# Patient Record
Sex: FEMALE | Race: BLACK OR AFRICAN AMERICAN | NOT HISPANIC OR LATINO | Employment: UNEMPLOYED | URBAN - METROPOLITAN AREA
[De-identification: names, ages, dates, MRNs, and addresses within clinical notes are randomized per-mention and may not be internally consistent; named-entity substitution may affect disease eponyms.]

---

## 2023-05-12 ENCOUNTER — APPOINTMENT (OUTPATIENT)
Dept: LAB | Facility: LAB | Age: 53
End: 2023-05-12
Payer: COMMERCIAL

## 2023-05-12 LAB
ALBUMIN (G/DL) IN SER/PLAS: 3.8 G/DL (ref 3.4–5)
ANION GAP IN SER/PLAS: 10 MMOL/L (ref 10–20)
CALCIUM (MG/DL) IN SER/PLAS: 8.5 MG/DL (ref 8.6–10.6)
CARBON DIOXIDE, TOTAL (MMOL/L) IN SER/PLAS: 31 MMOL/L (ref 21–32)
CHLORIDE (MMOL/L) IN SER/PLAS: 105 MMOL/L (ref 98–107)
CHOLESTEROL (MG/DL) IN SER/PLAS: 137 MG/DL (ref 0–199)
CHOLESTEROL IN HDL (MG/DL) IN SER/PLAS: 48.5 MG/DL
CHOLESTEROL/HDL RATIO: 2.8
CREATININE (MG/DL) IN SER/PLAS: 0.8 MG/DL (ref 0.5–1.05)
GFR FEMALE: 88 ML/MIN/1.73M2
GLUCOSE (MG/DL) IN SER/PLAS: 86 MG/DL (ref 74–99)
LDL: 67 MG/DL (ref 0–99)
MAGNESIUM (MG/DL) IN SER/PLAS: 2.14 MG/DL (ref 1.6–2.4)
NATRIURETIC PEPTIDE B (PG/ML) IN SER/PLAS: 34 PG/ML (ref 0–99)
PHOSPHATE (MG/DL) IN SER/PLAS: 3.1 MG/DL (ref 2.5–4.9)
POTASSIUM (MMOL/L) IN SER/PLAS: 4 MMOL/L (ref 3.5–5.3)
SODIUM (MMOL/L) IN SER/PLAS: 142 MMOL/L (ref 136–145)
TRIGLYCERIDE (MG/DL) IN SER/PLAS: 107 MG/DL (ref 0–149)
UREA NITROGEN (MG/DL) IN SER/PLAS: 16 MG/DL (ref 6–23)
VLDL: 21 MG/DL (ref 0–40)

## 2023-05-15 ENCOUNTER — APPOINTMENT (OUTPATIENT)
Dept: PRIMARY CARE | Facility: CLINIC | Age: 53
End: 2023-05-15
Payer: COMMERCIAL

## 2023-10-18 DIAGNOSIS — K21.9 GASTRO-ESOPHAGEAL REFLUX DISEASE WITHOUT ESOPHAGITIS: ICD-10-CM

## 2023-10-19 RX ORDER — OMEPRAZOLE 20 MG/1
20 CAPSULE, DELAYED RELEASE ORAL DAILY
Qty: 90 CAPSULE | Refills: 1 | Status: SHIPPED | OUTPATIENT
Start: 2023-10-19 | End: 2024-02-21 | Stop reason: SDUPTHER

## 2023-10-19 RX ORDER — OMEPRAZOLE 20 MG/1
20 CAPSULE, DELAYED RELEASE ORAL DAILY
COMMUNITY
End: 2023-10-19 | Stop reason: SDUPTHER

## 2023-10-31 ENCOUNTER — TELEPHONE (OUTPATIENT)
Dept: PRIMARY CARE | Facility: HOSPITAL | Age: 53
End: 2023-10-31
Payer: COMMERCIAL

## 2023-10-31 DIAGNOSIS — R09.81 NASAL CONGESTION: Primary | ICD-10-CM

## 2023-10-31 RX ORDER — GUAIFENESIN 600 MG/1
600 TABLET, EXTENDED RELEASE ORAL 2 TIMES DAILY
Status: DISCONTINUED | OUTPATIENT
Start: 2023-10-31 | End: 2023-10-31

## 2023-10-31 RX ORDER — CODEINE PHOSPHATE AND GUAIFENESIN 10; 100 MG/5ML; MG/5ML
5 SOLUTION ORAL EVERY 6 HOURS PRN
Qty: 140 ML | Refills: 0 | Status: SHIPPED | OUTPATIENT
Start: 2023-10-31 | End: 2023-11-07

## 2023-10-31 NOTE — TELEPHONE ENCOUNTER
Patient called requesting RX for generic Mucinex. Patient states her sinuses are draining and the mucous is to thick for her to cough up.    [facility] pharmacy on [address]

## 2023-11-29 ENCOUNTER — OFFICE VISIT (OUTPATIENT)
Dept: OBSTETRICS AND GYNECOLOGY | Facility: HOSPITAL | Age: 53
End: 2023-11-29
Payer: COMMERCIAL

## 2023-11-29 VITALS
HEIGHT: 65 IN | BODY MASS INDEX: 44.65 KG/M2 | WEIGHT: 268 LBS | SYSTOLIC BLOOD PRESSURE: 135 MMHG | DIASTOLIC BLOOD PRESSURE: 88 MMHG

## 2023-11-29 DIAGNOSIS — Z01.419 WELL WOMAN EXAM: Primary | ICD-10-CM

## 2023-11-29 DIAGNOSIS — Z11.3 SCREENING FOR STD (SEXUALLY TRANSMITTED DISEASE): ICD-10-CM

## 2023-11-29 DIAGNOSIS — E04.1 THYROID NODULE: ICD-10-CM

## 2023-11-29 DIAGNOSIS — R10.2 PELVIC PRESSURE IN FEMALE: ICD-10-CM

## 2023-11-29 LAB
POC APPEARANCE, URINE: CLEAR
POC BILIRUBIN, URINE: NEGATIVE
POC BLOOD, URINE: ABNORMAL
POC COLOR, URINE: YELLOW
POC GLUCOSE, URINE: NEGATIVE MG/DL
POC KETONES, URINE: NEGATIVE MG/DL
POC LEUKOCYTES, URINE: NEGATIVE
POC NITRITE,URINE: NEGATIVE
POC PH, URINE: 6 PH
POC PROTEIN, URINE: NEGATIVE MG/DL
POC SPECIFIC GRAVITY, URINE: >=1.03
POC UROBILINOGEN, URINE: 0.2 EU/DL

## 2023-11-29 PROCEDURE — 87086 URINE CULTURE/COLONY COUNT: CPT

## 2023-11-29 PROCEDURE — 99203 OFFICE O/P NEW LOW 30 MIN: CPT

## 2023-11-29 PROCEDURE — 99213 OFFICE O/P EST LOW 20 MIN: CPT

## 2023-11-29 PROCEDURE — 1036F TOBACCO NON-USER: CPT

## 2023-11-29 PROCEDURE — 87800 DETECT AGNT MULT DNA DIREC: CPT

## 2023-11-29 PROCEDURE — 87661 TRICHOMONAS VAGINALIS AMPLIF: CPT | Mod: 59

## 2023-11-29 RX ORDER — ACETAMINOPHEN 325 MG/1
500 TABLET ORAL EVERY 4 HOURS PRN
COMMUNITY
Start: 2015-08-20 | End: 2024-02-21 | Stop reason: SDUPTHER

## 2023-11-29 RX ORDER — ASPIRIN 81 MG/1
81 TABLET ORAL DAILY
COMMUNITY
End: 2024-02-21 | Stop reason: WASHOUT

## 2023-11-29 RX ORDER — CARVEDILOL 25 MG/1
25 TABLET ORAL 2 TIMES DAILY
COMMUNITY
End: 2024-02-21 | Stop reason: SDUPTHER

## 2023-11-29 RX ORDER — HYDRALAZINE HYDROCHLORIDE 10 MG/1
10 TABLET, FILM COATED ORAL 2 TIMES DAILY
COMMUNITY
End: 2024-02-21 | Stop reason: SDUPTHER

## 2023-11-29 RX ORDER — LIDOCAINE 560 MG/1
2 PATCH PERCUTANEOUS; TOPICAL; TRANSDERMAL DAILY
COMMUNITY
Start: 2022-12-07 | End: 2024-02-21 | Stop reason: SDUPTHER

## 2023-11-29 RX ORDER — SPIRONOLACTONE 25 MG/1
25 TABLET ORAL DAILY
COMMUNITY
Start: 2021-08-03 | End: 2024-02-21 | Stop reason: WASHOUT

## 2023-11-29 RX ORDER — FUROSEMIDE 40 MG/1
40 TABLET ORAL DAILY
COMMUNITY
End: 2024-02-21 | Stop reason: SDUPTHER

## 2023-11-29 RX ORDER — LORATADINE 10 MG/1
1 TABLET ORAL DAILY
COMMUNITY
Start: 2018-02-05 | End: 2024-04-02 | Stop reason: ALTCHOICE

## 2023-11-29 SDOH — ECONOMIC STABILITY: FOOD INSECURITY: WITHIN THE PAST 12 MONTHS, THE FOOD YOU BOUGHT JUST DIDN'T LAST AND YOU DIDN'T HAVE MONEY TO GET MORE.: NEVER TRUE

## 2023-11-29 SDOH — ECONOMIC STABILITY: HOUSING INSECURITY
IN THE LAST 12 MONTHS, WAS THERE A TIME WHEN YOU DID NOT HAVE A STEADY PLACE TO SLEEP OR SLEPT IN A SHELTER (INCLUDING NOW)?: NO

## 2023-11-29 SDOH — ECONOMIC STABILITY: INCOME INSECURITY: IN THE LAST 12 MONTHS, WAS THERE A TIME WHEN YOU WERE NOT ABLE TO PAY THE MORTGAGE OR RENT ON TIME?: NO

## 2023-11-29 SDOH — ECONOMIC STABILITY: FOOD INSECURITY: WITHIN THE PAST 12 MONTHS, YOU WORRIED THAT YOUR FOOD WOULD RUN OUT BEFORE YOU GOT MONEY TO BUY MORE.: NEVER TRUE

## 2023-11-29 ASSESSMENT — PATIENT HEALTH QUESTIONNAIRE - PHQ9
1. LITTLE INTEREST OR PLEASURE IN DOING THINGS: NOT AT ALL
2. FEELING DOWN, DEPRESSED OR HOPELESS: NOT AT ALL
SUM OF ALL RESPONSES TO PHQ9 QUESTIONS 1 AND 2: 0

## 2023-11-29 NOTE — PROGRESS NOTES
"Assessment/Plan   Problem List Items Addressed This Visit             ICD-10-CM    Well woman exam - Primary Z01.419     Well woman exam completed; PAP due in 2027         Relevant Orders    TSH with reflex to Free T4 if abnormal    CBC    Colonoscopy Screening; Average Risk Patient    Pelvic pressure in female R10.2     Patient states she \"feels pelvic pressure and has random spotting\" post voiding.  Urine dip shows trace blood; urine culture sent; results pending  Ultrasound ordered; patient to schedule         Relevant Orders    POC Urine Dip    US PELVIS TRANSABDOMINAL WITH TRANSVAGINAL    Urine culture    Thyroid nodule E04.1     Thyroid nodule palpated on patient's right side; TSH ordered; results pending  Referral to endocrinology ordered; patient to schedule         Relevant Orders    Referral to Reproductive Endocrinology     Other Visit Diagnoses         Codes    Screening for STD (sexually transmitted disease)     Z11.3    Relevant Orders    C. Trachomatis / N. Gonorrhoeae, Amplified Detection    Trichomonas vaginalis, Nucleic Acid Detection    HIV 1/2 Antigen/Antibody Screen with Reflex to Confirmation    Hepatitis B Surface Antigen    Hepatitis C Antibody    Syphilis Screen with Reflex            Isabella Bolaños, SONNY-FLORESM     Subjective   Carol Arevalo is a 53 y.o. female who is here for a routine exam.     Concerns today:  Spotting and more irregular menses    Patient's last menstrual period was 10/12/2023.   Periods are regular every 28-30 days, lasting  7-10  days.  Patient recently has noticed periods lasting longer with intermittent spotting after voiding.   Dysmenorrhea:none. Notes having pelvic pressure and pain with the spotting post voiding  Cyclic symptoms include none.     Sexual Activity: sexually active, male partners; Patient reports 1 partners in the last 12 months.  Pain with intercourse? No   Loss of desire? No       History of prior STI: none    Current contraception: " "none    Last pap:   History of abnormal Pap smear: no  Family history of uterine or ovarian cancer: no    Last mammogram:   History of abnormal mammogram: no  Family history of breast cancer: no  Menstrual History:  OB History          3    Para   3    Term   3            AB        Living   3         SAB        IAB        Ectopic        Multiple        Live Births   3                  Patient's last menstrual period was 10/12/2023.       Objective   /88   Ht 1.651 m (5' 5\")   Wt 122 kg (268 lb)   LMP 10/12/2023   BMI 44.60 kg/m²   Physical Exam  Constitutional:       Appearance: Normal appearance.   Genitourinary:      Vulva, bladder and urethral meatus normal.      Genitourinary Comments: Fibrous tissue noted bilaterally      Vaginal cuff intact.     Uterus is midaxial.      Pelvic exam was performed with patient in the lithotomy position.   Breasts:     Breasts are soft.     Right: Normal.      Left: Normal.   HENT:      Head: Normocephalic and atraumatic.      Mouth/Throat:      Mouth: Mucous membranes are moist.   Neck:      Thyroid: Thyroid mass present.      Trachea: Trachea normal.     Cardiovascular:      Rate and Rhythm: Normal rate and regular rhythm.      Heart sounds: Normal heart sounds.   Pulmonary:      Effort: Pulmonary effort is normal.      Breath sounds: Normal breath sounds.   Abdominal:      Palpations: Abdomen is soft.      Comments: Pain illicited upon palpation of mid to lower abdominal area; patient has known fibroids.    Musculoskeletal:         General: Normal range of motion.      Cervical back: Normal range of motion.   Neurological:      Mental Status: She is alert and oriented to person, place, and time.   Skin:     General: Skin is warm and dry.   Psychiatric:         Mood and Affect: Mood normal.         Behavior: Behavior normal.         Thought Content: Thought content normal.         Judgment: Judgment normal.          "

## 2023-11-29 NOTE — ASSESSMENT & PLAN NOTE
Thyroid nodule palpated on patient's right side; TSH ordered; results pending  Referral to endocrinology ordered; patient to schedule

## 2023-11-29 NOTE — ASSESSMENT & PLAN NOTE
"Patient states she \"feels pelvic pressure and has random spotting\" post voiding.  Urine dip shows trace blood; urine culture sent; results pending  Ultrasound ordered; patient to schedule  "

## 2023-11-30 LAB
BACTERIA UR CULT: NORMAL
C TRACH RRNA SPEC QL NAA+PROBE: NEGATIVE
N GONORRHOEA DNA SPEC QL PROBE+SIG AMP: NEGATIVE
T VAGINALIS RRNA SPEC QL NAA+PROBE: NEGATIVE

## 2023-12-30 ENCOUNTER — APPOINTMENT (OUTPATIENT)
Dept: OPHTHALMOLOGY | Facility: CLINIC | Age: 53
End: 2023-12-30
Payer: COMMERCIAL

## 2024-01-08 ENCOUNTER — APPOINTMENT (OUTPATIENT)
Dept: RADIOLOGY | Facility: HOSPITAL | Age: 54
End: 2024-01-08
Payer: COMMERCIAL

## 2024-01-23 ENCOUNTER — APPOINTMENT (OUTPATIENT)
Dept: OPHTHALMOLOGY | Facility: CLINIC | Age: 54
End: 2024-01-23
Payer: COMMERCIAL

## 2024-02-05 DIAGNOSIS — J45.909 UNSPECIFIED ASTHMA, UNCOMPLICATED (HHS-HCC): ICD-10-CM

## 2024-02-05 RX ORDER — ALBUTEROL SULFATE 90 UG/1
AEROSOL, METERED RESPIRATORY (INHALATION)
Qty: 8.5 G | Refills: 3 | Status: SHIPPED | OUTPATIENT
Start: 2024-02-05 | End: 2024-02-22 | Stop reason: SDUPTHER

## 2024-02-21 ENCOUNTER — OFFICE VISIT (OUTPATIENT)
Dept: PRIMARY CARE | Facility: HOSPITAL | Age: 54
End: 2024-02-21
Payer: COMMERCIAL

## 2024-02-21 VITALS
BODY MASS INDEX: 44.48 KG/M2 | HEART RATE: 79 BPM | HEIGHT: 65 IN | SYSTOLIC BLOOD PRESSURE: 129 MMHG | WEIGHT: 267 LBS | TEMPERATURE: 97.6 F | OXYGEN SATURATION: 96 % | DIASTOLIC BLOOD PRESSURE: 81 MMHG

## 2024-02-21 DIAGNOSIS — E66.01 CLASS 3 SEVERE OBESITY WITH BODY MASS INDEX (BMI) OF 40.0 TO 44.9 IN ADULT, UNSPECIFIED OBESITY TYPE, UNSPECIFIED WHETHER SERIOUS COMORBIDITY PRESENT (MULTI): ICD-10-CM

## 2024-02-21 DIAGNOSIS — U09.9 LONG COVID: ICD-10-CM

## 2024-02-21 DIAGNOSIS — K21.9 GASTRO-ESOPHAGEAL REFLUX DISEASE WITHOUT ESOPHAGITIS: ICD-10-CM

## 2024-02-21 DIAGNOSIS — E04.1 THYROID NODULE: ICD-10-CM

## 2024-02-21 DIAGNOSIS — I10 PRIMARY HYPERTENSION: ICD-10-CM

## 2024-02-21 DIAGNOSIS — R52 PAIN: ICD-10-CM

## 2024-02-21 DIAGNOSIS — I50.32 CHRONIC DIASTOLIC CONGESTIVE HEART FAILURE (MULTI): Primary | ICD-10-CM

## 2024-02-21 LAB
ALBUMIN SERPL BCP-MCNC: 4 G/DL (ref 3.4–5)
ANION GAP SERPL CALC-SCNC: 18 MMOL/L (ref 10–20)
BUN SERPL-MCNC: 15 MG/DL (ref 6–23)
CALCIUM SERPL-MCNC: 9 MG/DL (ref 8.6–10.6)
CHLORIDE SERPL-SCNC: 106 MMOL/L (ref 98–107)
CHOLEST SERPL-MCNC: 174 MG/DL (ref 0–199)
CHOLESTEROL/HDL RATIO: 3.3
CO2 SERPL-SCNC: 17 MMOL/L (ref 21–32)
CREAT SERPL-MCNC: 0.93 MG/DL (ref 0.5–1.05)
EGFRCR SERPLBLD CKD-EPI 2021: 74 ML/MIN/1.73M*2
EST. AVERAGE GLUCOSE BLD GHB EST-MCNC: 123 MG/DL
GLUCOSE SERPL-MCNC: 81 MG/DL (ref 74–99)
HBA1C MFR BLD: 5.9 %
HDLC SERPL-MCNC: 52.2 MG/DL
LDLC SERPL CALC-MCNC: 101 MG/DL
MAGNESIUM SERPL-MCNC: 2.79 MG/DL (ref 1.6–2.4)
NON HDL CHOLESTEROL: 122 MG/DL (ref 0–149)
PHOSPHATE SERPL-MCNC: 3.3 MG/DL (ref 2.5–4.9)
POTASSIUM SERPL-SCNC: 4.6 MMOL/L (ref 3.5–5.3)
SODIUM SERPL-SCNC: 136 MMOL/L (ref 136–145)
TRIGL SERPL-MCNC: 105 MG/DL (ref 0–149)
TSH SERPL-ACNC: 1.12 MIU/L (ref 0.44–3.98)
VLDL: 21 MG/DL (ref 0–40)

## 2024-02-21 PROCEDURE — 36415 COLL VENOUS BLD VENIPUNCTURE: CPT

## 2024-02-21 PROCEDURE — 83036 HEMOGLOBIN GLYCOSYLATED A1C: CPT

## 2024-02-21 PROCEDURE — 80061 LIPID PANEL: CPT

## 2024-02-21 PROCEDURE — 99215 OFFICE O/P EST HI 40 MIN: CPT | Mod: GC

## 2024-02-21 PROCEDURE — 99215 OFFICE O/P EST HI 40 MIN: CPT

## 2024-02-21 PROCEDURE — 3008F BODY MASS INDEX DOCD: CPT

## 2024-02-21 PROCEDURE — 3074F SYST BP LT 130 MM HG: CPT

## 2024-02-21 PROCEDURE — 80069 RENAL FUNCTION PANEL: CPT

## 2024-02-21 PROCEDURE — 83735 ASSAY OF MAGNESIUM: CPT

## 2024-02-21 PROCEDURE — 1036F TOBACCO NON-USER: CPT

## 2024-02-21 PROCEDURE — 84443 ASSAY THYROID STIM HORMONE: CPT

## 2024-02-21 PROCEDURE — 3079F DIAST BP 80-89 MM HG: CPT

## 2024-02-21 RX ORDER — ACETAMINOPHEN 325 MG/1
500 TABLET ORAL EVERY 4 HOURS PRN
Qty: 30 TABLET | Refills: 3 | Status: SHIPPED | OUTPATIENT
Start: 2024-02-21 | End: 2024-02-21 | Stop reason: SDUPTHER

## 2024-02-21 RX ORDER — OMEPRAZOLE 20 MG/1
20 CAPSULE, DELAYED RELEASE ORAL DAILY
Qty: 90 CAPSULE | Refills: 1 | Status: SHIPPED | OUTPATIENT
Start: 2024-02-21 | End: 2024-04-02 | Stop reason: SDUPTHER

## 2024-02-21 RX ORDER — SPIRONOLACTONE 100 MG/1
100 TABLET, FILM COATED ORAL DAILY
Qty: 90 TABLET | Refills: 1 | Status: SHIPPED | OUTPATIENT
Start: 2024-02-21 | End: 2024-08-19

## 2024-02-21 RX ORDER — ACETAMINOPHEN 325 MG/1
1000 TABLET ORAL EVERY 8 HOURS PRN
Qty: 30 TABLET | Refills: 3 | Status: SHIPPED | OUTPATIENT
Start: 2024-02-21

## 2024-02-21 RX ORDER — HYDRALAZINE HYDROCHLORIDE 10 MG/1
10 TABLET, FILM COATED ORAL 2 TIMES DAILY
Qty: 180 TABLET | Refills: 1 | Status: SHIPPED | OUTPATIENT
Start: 2024-02-21 | End: 2024-08-19

## 2024-02-21 RX ORDER — LOSARTAN POTASSIUM 25 MG/1
25 TABLET ORAL DAILY
Qty: 90 TABLET | Refills: 1 | Status: SHIPPED | OUTPATIENT
Start: 2024-02-21 | End: 2024-08-19

## 2024-02-21 RX ORDER — ALBUTEROL SULFATE 0.83 MG/ML
2.5 SOLUTION RESPIRATORY (INHALATION) EVERY 6 HOURS PRN
COMMUNITY
Start: 2023-11-29

## 2024-02-21 RX ORDER — LIDOCAINE 560 MG/1
2 PATCH PERCUTANEOUS; TOPICAL; TRANSDERMAL DAILY
Qty: 60 PATCH | Refills: 0 | Status: SHIPPED | OUTPATIENT
Start: 2024-02-21 | End: 2024-03-22

## 2024-02-21 RX ORDER — SPIRONOLACTONE 100 MG/1
100 TABLET, FILM COATED ORAL DAILY
COMMUNITY
Start: 2024-02-04 | End: 2024-02-21 | Stop reason: SDUPTHER

## 2024-02-21 RX ORDER — ISOSORBIDE MONONITRATE 30 MG/1
30 TABLET, EXTENDED RELEASE ORAL DAILY
Qty: 90 TABLET | Refills: 1 | Status: SHIPPED | OUTPATIENT
Start: 2024-02-21 | End: 2024-06-04 | Stop reason: SDUPTHER

## 2024-02-21 RX ORDER — CARVEDILOL 25 MG/1
25 TABLET ORAL 2 TIMES DAILY
Qty: 180 TABLET | Refills: 1 | Status: SHIPPED | OUTPATIENT
Start: 2024-02-21 | End: 2024-06-04 | Stop reason: SDUPTHER

## 2024-02-21 RX ORDER — FUROSEMIDE 40 MG/1
40 TABLET ORAL DAILY
Qty: 90 TABLET | Refills: 1 | Status: SHIPPED | OUTPATIENT
Start: 2024-02-21 | End: 2024-08-19

## 2024-02-21 ASSESSMENT — ENCOUNTER SYMPTOMS
LOSS OF SENSATION IN FEET: 0
OCCASIONAL FEELINGS OF UNSTEADINESS: 0
DEPRESSION: 0

## 2024-02-21 ASSESSMENT — PAIN SCALES - GENERAL: PAINLEVEL: 0-NO PAIN

## 2024-02-21 NOTE — PATIENT INSTRUCTIONS
Dear Ms. Arevalo,    It was a pleasure taking care of you in the [facility].     As discussed today, our plan is:     Labs - you got these done in the office.  Medication changes: I have ordered your medications for you. Please pick these up at your pharmacy.   Please complete your mammogram and your pelvic ultrasound.   Please see your cardiologist.   Vaccines: Please schedule a nurse visit for vaccines.   Please see the psychologist to help with some of the symptoms you are experiencing since your COVID infection.     Please come back to see me in:  3 months.   ------  If you have any problems or questions, please contact the clinic at [phone] to leave a message. Our fax number is [phone]. If your issue cannot wait until the next business day, please go to urgent care or the emergency department.     I also strongly urge all of my patients to register for [facility] by going to: [email or web address]  (The  staff can also send you a text/email link to register when you check out).    No shows: It is understandable if you are unable to make it to a visit, but please cancel your appointment instead of not showing up. This helps to give other patients access to primary care and keeps wait times low.      Dr. Kim Jennings

## 2024-02-21 NOTE — PROGRESS NOTES
CC: Prescription refills and minor aches and pains    HPI: Carol Arevalo is a 53 y.o. with PMH including CHF secondary to NICM (EF 55 to 60% August 2020), morbid obesity, remote history of PE, depressive symptoms, asthma, MIRI, and chronic body pain who presents to the clinic for prescription refills and minor aches and pains.     Interval History/History per chart review: She was last seen in the [facility] in July 2023. At that time her medications were refilled. She was referred to sleep medicine for sleep study, referred for colonoscopy, and referred for mammogram. Vaccines were discussed during that visit.  Since then, the patient has also seen OB/GYN. At that time, she was complaining of pelvic pressure and random spotting.  She was also noted to have a thyroid nodule at that time.    Per the patient:  The patient states that she continues have pain in her breast since feeling lumps now in the right breast.  She states that she has not had a period since January.  Last period was in December.  Endorses tenderness in the breasts.    She continues to endorse pain in her back. She is asking for lidocaine patches. She also states that she is having aches and pains and is feeling pain in the legs. She is describing the sensation as a stiffness. She states that she has to work extra to get out of the chair.    She states that she had COVID 2 to 3 years ago.  She states that she had items that she wore when she had COVID including a wig, hat, shoes, as well as some towels.  Afterwards, she recovered from COVID.  However, she states that when she would put on the items such as the wig or the hat, she would have symptoms of dizziness and nausea.  This would last until she took the garments off.  She also noticed this with the shoes after which she would touch it and would feel aches and pains in the hands from feeling it.  Her symptoms include feeling dizzy for couple of days before getting better as well as feeling  nauseous for 3 days.  She continues to endorse having the sensations even to this day.  Her aches and pains are in the hands, the toes, the legs.  She also endorses a slight headache when she is touching these items.  She can also feel pain on both sides in the back. She also feels queasiness in her stomach. She states that Tylenol helps subside the pain. She feels that rubbing alcohol also helps subside the pain.    She states that she feels like a germophobic.  She says she is spraying things down, has Lysol wipes, and is using rubbing alcohol.  She feels as though there might be a COVID-vaccine.  She is feeling more fatigued than usual, and is tired of constantly washing the house and sanitizing.  She states that if she is not sanitizing it feels like something was missed.  She is not cleaning every day and states that she is not compulsive.  She also endorses a sweet smell that comes around.  She wears gloves when she is doing different activities.    She is also endorsing some chest pain, and it feels as though someone has taken her finger and holding it tightly.  She endorses discomfort in the chest and may be a little bit of pressure.  Sometimes she feels shortness of breath or fatigue when this happens.  She feels sweaty once or twice but otherwise has not felt sweaty.  She said Tylenol and Mucinex helps make the pain better.  Resting makes it better.  Her chest pain is worse when there is nothing available for her to take it.  She states that when she touches something or there is someone around her with a cold or illness, she notices that the sensation coming back up from her feet.    PMHx:   Past Medical History:   Diagnosis Date    Acute sinusitis, unspecified 11/10/2016    Subacute sinusitis, unspecified location    Acute streptococcal tonsillitis, unspecified 04/13/2017    Strep tonsillitis    Cardiomyopathy, unspecified (CMS/[facility]) 09/14/2021    Cardiomyopathy    Cervicalgia 05/26/2017    Spine pain,  cervical    Encounter for gynecological examination (general) (routine) without abnormal findings 05/31/2016    Encounter for annual routine gynecological examination    Encounter for gynecological examination (general) (routine) without abnormal findings 06/10/2016    Well woman exam with routine gynecological exam    Encounter for other general counseling and advice on contraception 05/31/2016    Counseling for birth control, intrauterine device    Encounter for other screening for malignant neoplasm of breast 11/27/2017    Screening for breast cancer    Encounter for screening for infections with a predominantly sexual mode of transmission 05/31/2016    Routine screening for STI (sexually transmitted infection)    Encounter for screening for infections with a predominantly sexual mode of transmission 03/24/2015    Routine screening for STI (sexually transmitted infection)    Encounter for screening for infections with a predominantly sexual mode of transmission     Screening for STDs (sexually transmitted diseases)    Encounter for screening for malignant neoplasm of cervix 01/14/2022    Cervical cancer screening    Essential (primary) hypertension     Chronic hypertension    Morbid (severe) obesity due to excess calories (CMS/[facility]) 03/20/2018    Obesity, morbid (more than 100 lbs over ideal weight or BMI > 40)    Other chest pain 05/04/2017    Atypical chest pain    Other conditions influencing health status     History of vaginal delivery    Other disorders of electrolyte and fluid balance, not elsewhere classified 10/15/2016    Electrolyte and fluid disorder    Other problems related to lifestyle 05/31/2016    Other problems related to lifestyle    Other problems related to lifestyle     Other problems related to lifestyle    Other pulmonary embolism without acute cor pulmonale (CMS/HCC) 12/18/2020    Pulmonary embolism    Personal history of other benign neoplasm 08/30/2016    History of uterine leiomyoma     Personal history of other diseases of the circulatory system     History of congestive heart failure    Personal history of other diseases of the circulatory system 2013    History of sinus tachycardia    Personal history of other diseases of the digestive system 2017    History of esophageal reflux    Personal history of other diseases of the musculoskeletal system and connective tissue 10/13/2017    History of muscle pain    Personal history of other diseases of the respiratory system     History of sore throat    Personal history of other diseases of the respiratory system 11/10/2016    History of sinusitis    Personal history of other specified conditions 10/15/2016    History of dizziness    Personal history of other specified conditions 2015    History of bradycardia    Polyp of cervix uteri 2022    Cervical polyp    Unspecified asthma, uncomplicated 2021    Asthma      PSHx:   Past Surgical History:   Procedure Laterality Date    OTHER SURGICAL HISTORY  2015    Biopsy Vulvar      OB/GYN Hx:   OB History          3    Para   3    Term   3            AB        Living   3         SAB        IAB        Ectopic        Multiple        Live Births   3                FHx: No family history on file.     Social:   Tobacco: Smoked a few cigarettes here and there when she was 18.  Not a current smoker.  However, she is surrounded by secondhand smoke.  Alcohol: She drinks alcohol socially.  She states that she will drink about half a bottle of vodka during a social event.  She drinks the water but reports that she was.  She may have a shot.  Drugs: None  Living situation: Lives with daughter  Work: Yes, for the school [employer]  Sexually active: Yes (1 partner)    ALL:   Allergies   Allergen Reactions    Naproxen Dizziness      Meds:   Current Outpatient Medications:     acetaminophen (Tylenol) 325 mg tablet, Take 500 mg by mouth every 4 hours if needed., Disp: , Rfl:      "ALBUTEROL 0.375 MG/ML CONT NEB SYR-SIMPLE, Inhale 4 times a day as needed., Disp: , Rfl:     albuterol 90 mcg/actuation inhaler, INHALE 1 PUFF BY MOUTH EVERY 4 TO 6 HOURS AS NEEDED FOR COUGH AND SHORTNESS OF BREATH, Disp: 8.5 g, Rfl: 3    aspirin 81 mg EC tablet, Take 1 tablet (81 mg) by mouth once daily., Disp: , Rfl:     carvedilol (Coreg) 25 mg tablet, Take 1 tablet (25 mg) by mouth 2 times a day., Disp: , Rfl:     furosemide (Lasix) 40 mg tablet, Take 0.5 tablets (20 mg) by mouth once daily., Disp: , Rfl:     hydrALAZINE (Apresoline) 10 mg tablet, Take 1 tablet (10 mg) by mouth 2 times a day., Disp: , Rfl:     lidocaine 4 % patch, Place 2 patches on the skin once daily., Disp: , Rfl:     loratadine (Claritin) 10 mg tablet, Take 1 tablet (10 mg) by mouth once daily., Disp: , Rfl:     losartan (Cozaar) 2.5 mg/mL oral suspension, Take 10 mL (25 mg) by mouth once daily., Disp: , Rfl:     omeprazole (PriLOSEC) 20 mg DR capsule, TAKE 1 CAPSULE BY MOUTH EVERY DAY IN THE MORNING BEFORE BREAKFAST, Disp: 90 capsule, Rfl: 1    spironolactone (Aldactone) 25 mg tablet, Take 1 tablet (25 mg) by mouth once daily., Disp: , Rfl:      ROS:   As per HPI.     Objective:  Visit Vitals  /81 (BP Location: Right arm, Patient Position: Sitting, BP Cuff Size: Adult)   Pulse 79   Temp 36.4 °C (97.6 °F) (Temporal)   Ht 1.651 m (5' 5\")   Wt 121 kg (267 lb)   SpO2 96%   BMI 44.43 kg/m²   OB Status Having periods   Smoking Status Never   BSA 2.36 m²     GEN: Awake, alert, no acute distress  HEENT: Normocephalic, atraumatic  CV: Normal S1 and S2, no murmurs, rubs, or gallops. Tenderness to palpation in the sternum  PULM: CTA bilaterally  ABD: Soft, nondistended, nontender  BACK: Pain under the right scapula on touch  EXTREM: Patient endorses swelling, but no peripheral edema  NEURO: 5/5 strength on hip flexion, extension, adduction, abduction, knee flexion, and knee extension    Labs:  Lab Results   Component Value Date    WBC 6.1 " 06/24/2022    HGB 12.1 06/24/2022    HCT 39.3 06/24/2022    MCV 86 06/24/2022     06/24/2022      Lab Results   Component Value Date    GLUCOSE 86 05/12/2023    CALCIUM 8.5 (L) 05/12/2023     05/12/2023    K 4.0 05/12/2023    CO2 31 05/12/2023     05/12/2023    BUN 16 05/12/2023    CREATININE 0.80 05/12/2023      Lab Results   Component Value Date    ALT 8 06/24/2022    AST 15 06/24/2022    ALKPHOS 55 06/24/2022    BILITOT 0.5 06/24/2022      Lab Results   Component Value Date    GLUCOSE 86 05/12/2023    CALCIUM 8.5 (L) 05/12/2023     05/12/2023    K 4.0 05/12/2023    CO2 31 05/12/2023     05/12/2023    BUN 16 05/12/2023    CREATININE 0.80 05/12/2023       Latest Reference Range & Units 05/12/23 16:11   HDL CHOLESTEROL mg/dL 48.5   Cholesterol/HDL Ratio  2.8   LDL Calculated 0 - 99 mg/dL 67   VLDL 0 - 40 mg/dL 21   TRIGLYCERIDES 0 - 149 mg/dL 107      Latest Reference Range & Units 05/12/23 16:11   CHOLESTEROL 0 - 199 mg/dL 137     Urine Studies:   Latest Reference Range & Units 11/29/23 09:52   POC Color, Urine Straw, Yellow, Light-Yellow  Yellow   POC Specific Gravity, Urine 1.005 - 1.035  >=1.030   POC PH, Urine No Reference Range Established PH 6.0   POC Protein, Urine NEGATIVE, 30 (1+) mg/dl NEGATIVE   POC Glucose, Urine NEGATIVE mg/dl NEGATIVE   POC Blood, Urine   NEGATIVE  TRACE-Intact !   POC Ketones, Urine NEGATIVE mg/dl NEGATIVE   POC Bilirubin, Urine NEGATIVE  NEGATIVE   POC Urobilinogen, Urine 0.2, 1.0 EU/DL 0.2   POC Leukocytes, Urine NEGATIVE  NEGATIVE   !: Data is abnormal    MICRO:  Urine culture (11/29): Urine culture negative     Latest Reference Range & Units 11/29/23 09:50   Chlamydia trachomatis, Amplified Negative  Negative   Neisseria gonorrhea,Amplified Negative  Negative   Trichomonas Vaginalis Negative, Invalid, TRICH neg  Negative     Imaging: No new imaging since last visit    Assessment/Plan  Carol Arevalo is a 53 y.o. with PMH including CHF secondary to  an ICM (EF 55 to 60% August 2020), morbid obesity, remote history of PE, depressive symptoms, asthma, MIRI, and chronic body pain who presents to the clinic for prescription refills as well as aches and pains.    #HFpEF (55-60%)  #Lower Extremity Edema likely multifactorial w/ component of venous insufficiency   #Orthostatic Hypotension  - NYHA class II Stage C HF, EF 55% in 2021  Plan:  - c/w carvedilol 25 mg bid, hydralazine 10 mg BID (how it was prescribed in the EMR), isosorbide mononitrate 30 mg daily (patient out of medication for 2 months), spironolactone 100 mg daily  - c/w furosemide 40mg BID  - c/w Losartan 25 mg daily   - Patient had aspirin written in her chart.  She states that she used to be on aspirin, but she has not had aspirin for a year.  Per chart review it appears she has nonischemic cardiomyopathy.  Will not be giving aspirin at this time.  - Needs follow-up with cardiology.  Patient states that she comes to see us first before going to cardiology. Chest pain likely noncardiac in nature given tenderness to palpation on exam.   - Ordered lab work: HgbA1c, RFP, Mag, Lipid Panel     #Breast tenderness  #Fibrocystic breast changes?  - Patient with cyclical breast pain related to periods.   - Mammogram Oct 2022: negative   Plan:  - Mammogram ordered last visit. Patient to complete it.     #Thyroid Nodule  - Had a thyroid nodule noticed on exam at OB/GYN office  - OB/GYN ordered TSH and T4 as well as thyroid ultrasound. Have not been completed.   Plan:  - TSH with reflex to T4  - Thyroid ultrasound at next visit.     #Pelvic pressure  - U/S ordered by OB/GYN, but it was not completed. Patient notified about pelvic ultrasound.     #Musculoskeletal back pain  #Aches and Pains, repetitive behaviors related to COVID-19 infection  - Right latissimus dorsi pain  - Patient has job which requires lifting a lot of objects off ground  - Patient endorsing aches and pains as well as nonspecific symptoms after her  COVID-19 infection   Plan:   - Continue tylenol prn. Continue with lidocaine patches.   - Referred to psychology to help with symptoms and experience after COVID-19 infection, including repetitive behaviors.      #HTN   - Continue carvedilol, hydralazine, isosorbide mononitrate, losartan as prescribed     #GERD  - c/w omeprazole 20mg daily     #Sleep apnea  - Referred to sleep medicine group at the last visit to repeat sleep study.    - Has not been completed. Will readdress at the next visit.      #Health Maintenance  - Immunizations: Influenza (due this season), Tdap (due), COVID (due), PPV23 (given 2015, due for PCV20). Patient states that she has gotten her childhood vaccines. She states she will make a nursing visit for her vaccines.   - Screening:  - Cardiovascular:   The 10-year ASCVD risk score (Lindsay SANCHEZ, et al., 2019) is: 3.6%    Values used to calculate the score:      Age: 53 years      Sex: Female      Is Non- : Yes      Diabetic: No      Tobacco smoker: No      Systolic Blood Pressure: 129 mmHg      Is BP treated: Yes      HDL Cholesterol: 48.5 mg/dL      Total Cholesterol: 137 mg/dL   - Diabetes: HgbA1c today  - Cancer: Breast (Due for Mammogram, ordered at the last visit), Cervical (Pap smear due in 2027), Colorectal (colonoscopy scheduled for March 2024), Lung (Not indicated)  - Infectious Disease: Screened for HIV and Hep in 2016  - Osteoporosis: Not currently indicated    To address at next visit:  [ ] Thyroid ultrasound  [ ] Vaccines if not completed  [ ] Sleep study for sleep apnea    RTC in 3 months.    Pt staffed with attending, Dr. Patrick.

## 2024-02-22 ENCOUNTER — TELEPHONE (OUTPATIENT)
Dept: PRIMARY CARE | Facility: HOSPITAL | Age: 54
End: 2024-02-22
Payer: COMMERCIAL

## 2024-02-22 DIAGNOSIS — J45.909 ASTHMA, UNSPECIFIED ASTHMA SEVERITY, UNSPECIFIED WHETHER COMPLICATED, UNSPECIFIED WHETHER PERSISTENT (HHS-HCC): Primary | ICD-10-CM

## 2024-02-22 DIAGNOSIS — I10 PRIMARY HYPERTENSION: Primary | ICD-10-CM

## 2024-02-22 DIAGNOSIS — J45.909 UNSPECIFIED ASTHMA, UNCOMPLICATED (HHS-HCC): ICD-10-CM

## 2024-02-22 RX ORDER — ALBUTEROL SULFATE 90 UG/1
AEROSOL, METERED RESPIRATORY (INHALATION)
Qty: 8.5 G | Refills: 3 | Status: SHIPPED | OUTPATIENT
Start: 2024-02-22

## 2024-02-22 NOTE — TELEPHONE ENCOUNTER
I attempted to call the patient at the listed phone number to discuss her lab results. She did not answer, so a HIPAA compliant voicemail was placed. Will wait for the patient to call back. If she does not call back, will attempt to call the patient again.     I reviewed the lab results.     Her bicarbonate is 17 and her magnesium is high. Will repeat laboratory levels in 2 weeks. If HCO3- is persistently low, will consider further work-up for metabolic acidosis. Orders placed.     Her Hgb A1c was 5.9, placing her in the prediabetes range. Will discuss with the patient lifestyle modifications and exercise.     Her lipid panel is unremarkable except for an LDL of 101.     The 10-year ASCVD risk score (Lindsay SANCHEZ, et al., 2019) is: 4.1%    Values used to calculate the score:      Age: 53 years      Sex: Female      Is Non- : Yes      Diabetic: No      Tobacco smoker: No      Systolic Blood Pressure: 129 mmHg      Is BP treated: Yes      HDL Cholesterol: 52.2 mg/dL      Total Cholesterol: 174 mg/dL     She has risk enhancers (mainly features of metabolic syndrome). As above, she should begin with lifestyle modifications and exercise to help reduce her risk.     I have ordered her albuterol inhaler and a nebulizer for her.     She should see her cardiologist for optimization of her cardiac health. She should also complete her mammogram and pelvic ultrasound and see the psychologist for help with coping with her symptoms post COVID-19.    Will follow-up with further preventative care at the next visit.

## 2024-02-22 NOTE — PROGRESS NOTES
I saw and evaluated the patient. I personally obtained the key and critical portions of the history and physical exam or was physically present for key and critical portions performed by the resident/fellow. I reviewed the resident/fellow's documentation and discussed the patient with the resident/fellow. I agree with the resident/fellow's medical decision making as documented in the note.    Migel Patrick MD MPH

## 2024-02-23 ENCOUNTER — TELEPHONE (OUTPATIENT)
Dept: PRIMARY CARE | Facility: HOSPITAL | Age: 54
End: 2024-02-23
Payer: COMMERCIAL

## 2024-02-23 NOTE — TELEPHONE ENCOUNTER
Called patient to discuss lab results. We discussed plan to recheck labs in 2 weeks for electrolyte abnormalities and also encouraged lifestyle modifications for elevated HBA1C and LDL. She was agreeable to this plan.

## 2024-03-27 DIAGNOSIS — Z12.11 COLON CANCER SCREENING: Primary | ICD-10-CM

## 2024-03-27 RX ORDER — POLYETHYLENE GLYCOL 3350, SODIUM CHLORIDE, SODIUM BICARBONATE, POTASSIUM CHLORIDE 420; 11.2; 5.72; 1.48 G/4L; G/4L; G/4L; G/4L
4000 POWDER, FOR SOLUTION ORAL ONCE
Qty: 4000 ML | Refills: 0 | Status: SHIPPED | OUTPATIENT
Start: 2024-03-27 | End: 2024-03-27

## 2024-03-27 NOTE — PROGRESS NOTES
Bowel preparation has been prescribed for patient's upcoming colonoscopy procedure.    Philip Redding MD  Gastroenterology

## 2024-03-29 ENCOUNTER — APPOINTMENT (OUTPATIENT)
Dept: GASTROENTEROLOGY | Facility: HOSPITAL | Age: 54
End: 2024-03-29
Payer: COMMERCIAL

## 2024-04-02 ENCOUNTER — OFFICE VISIT (OUTPATIENT)
Dept: PRIMARY CARE | Facility: HOSPITAL | Age: 54
End: 2024-04-02
Payer: COMMERCIAL

## 2024-04-02 VITALS
HEART RATE: 81 BPM | OXYGEN SATURATION: 97 % | WEIGHT: 260 LBS | HEIGHT: 65 IN | BODY MASS INDEX: 43.32 KG/M2 | SYSTOLIC BLOOD PRESSURE: 128 MMHG | DIASTOLIC BLOOD PRESSURE: 84 MMHG | TEMPERATURE: 97.4 F

## 2024-04-02 DIAGNOSIS — E66.01 CLASS 3 SEVERE OBESITY WITH BODY MASS INDEX (BMI) OF 40.0 TO 44.9 IN ADULT, UNSPECIFIED OBESITY TYPE, UNSPECIFIED WHETHER SERIOUS COMORBIDITY PRESENT (MULTI): ICD-10-CM

## 2024-04-02 DIAGNOSIS — J30.2 SEASONAL ALLERGIC RHINITIS, UNSPECIFIED TRIGGER: ICD-10-CM

## 2024-04-02 DIAGNOSIS — R06.83 SNORING: ICD-10-CM

## 2024-04-02 DIAGNOSIS — K21.9 GASTRO-ESOPHAGEAL REFLUX DISEASE WITHOUT ESOPHAGITIS: ICD-10-CM

## 2024-04-02 DIAGNOSIS — E87.20 METABOLIC ACIDOSIS: Primary | ICD-10-CM

## 2024-04-02 DIAGNOSIS — J45.909 ASTHMA, UNSPECIFIED ASTHMA SEVERITY, UNSPECIFIED WHETHER COMPLICATED, UNSPECIFIED WHETHER PERSISTENT (HHS-HCC): ICD-10-CM

## 2024-04-02 DIAGNOSIS — E04.1 THYROID NODULE: ICD-10-CM

## 2024-04-02 LAB
ALBUMIN SERPL BCP-MCNC: 3.9 G/DL (ref 3.4–5)
ANION GAP SERPL CALC-SCNC: 15 MMOL/L (ref 10–20)
BUN SERPL-MCNC: 13 MG/DL (ref 6–23)
CALCIUM SERPL-MCNC: 9.5 MG/DL (ref 8.6–10.6)
CHLORIDE SERPL-SCNC: 103 MMOL/L (ref 98–107)
CO2 SERPL-SCNC: 26 MMOL/L (ref 21–32)
CREAT SERPL-MCNC: 0.82 MG/DL (ref 0.5–1.05)
EGFRCR SERPLBLD CKD-EPI 2021: 85 ML/MIN/1.73M*2
GLUCOSE SERPL-MCNC: 76 MG/DL (ref 74–99)
PHOSPHATE SERPL-MCNC: 4 MG/DL (ref 2.5–4.9)
POTASSIUM SERPL-SCNC: 5 MMOL/L (ref 3.5–5.3)
SODIUM SERPL-SCNC: 139 MMOL/L (ref 136–145)

## 2024-04-02 PROCEDURE — 99215 OFFICE O/P EST HI 40 MIN: CPT | Performed by: STUDENT IN AN ORGANIZED HEALTH CARE EDUCATION/TRAINING PROGRAM

## 2024-04-02 PROCEDURE — 80069 RENAL FUNCTION PANEL: CPT | Performed by: STUDENT IN AN ORGANIZED HEALTH CARE EDUCATION/TRAINING PROGRAM

## 2024-04-02 PROCEDURE — 1036F TOBACCO NON-USER: CPT | Performed by: STUDENT IN AN ORGANIZED HEALTH CARE EDUCATION/TRAINING PROGRAM

## 2024-04-02 PROCEDURE — 99215 OFFICE O/P EST HI 40 MIN: CPT | Mod: GC | Performed by: STUDENT IN AN ORGANIZED HEALTH CARE EDUCATION/TRAINING PROGRAM

## 2024-04-02 PROCEDURE — 3008F BODY MASS INDEX DOCD: CPT | Performed by: STUDENT IN AN ORGANIZED HEALTH CARE EDUCATION/TRAINING PROGRAM

## 2024-04-02 PROCEDURE — 36415 COLL VENOUS BLD VENIPUNCTURE: CPT | Performed by: STUDENT IN AN ORGANIZED HEALTH CARE EDUCATION/TRAINING PROGRAM

## 2024-04-02 RX ORDER — FLUTICASONE PROPIONATE 50 MCG
1 SPRAY, SUSPENSION (ML) NASAL DAILY
Qty: 16 G | Refills: 5 | Status: SHIPPED | OUTPATIENT
Start: 2024-04-02 | End: 2025-04-02

## 2024-04-02 RX ORDER — CETIRIZINE HYDROCHLORIDE 10 MG/1
10 TABLET ORAL DAILY PRN
Qty: 30 TABLET | Refills: 2 | Status: SHIPPED | OUTPATIENT
Start: 2024-04-02 | End: 2024-07-01

## 2024-04-02 RX ORDER — PEN NEEDLE, DIABETIC 31 GX5/16"
NEEDLE, DISPOSABLE MISCELLANEOUS
Qty: 1 EACH | Refills: 0 | Status: SHIPPED | OUTPATIENT
Start: 2024-04-02 | End: 2024-04-09 | Stop reason: SDUPTHER

## 2024-04-02 RX ORDER — OMEPRAZOLE 40 MG/1
40 CAPSULE, DELAYED RELEASE ORAL
Qty: 30 CAPSULE | Refills: 3 | Status: SHIPPED | OUTPATIENT
Start: 2024-04-02 | End: 2024-07-31

## 2024-04-02 ASSESSMENT — ENCOUNTER SYMPTOMS
LOSS OF SENSATION IN FEET: 0
OCCASIONAL FEELINGS OF UNSTEADINESS: 0
DEPRESSION: 0

## 2024-04-02 ASSESSMENT — PAIN SCALES - GENERAL: PAINLEVEL: 1

## 2024-04-02 ASSESSMENT — PATIENT HEALTH QUESTIONNAIRE - PHQ9
2. FEELING DOWN, DEPRESSED OR HOPELESS: NOT AT ALL
SUM OF ALL RESPONSES TO PHQ9 QUESTIONS 1 AND 2: 0
1. LITTLE INTEREST OR PLEASURE IN DOING THINGS: NOT AT ALL

## 2024-04-02 NOTE — PATIENT INSTRUCTIONS
Stephanie Ms. Arevalo,    It was a pleasure meeting you! Today we talked about several important aspects of your health.    1) For your chest pain: I suspect that this may be related to gastroesophageal reflux. To treat this, I would like you to take omeprazole 40 mg daily for the next month to see if this helps. Please return to clinic in 6-8 weeks and we will discuss if this is helping your symptoms.     2) For your throat pain, I would like to get an ultrasound of thyroid to ensure that this is not contributing. Please call [phone] to schedule.    3) For your allergies, please start using one spray of flonase per nostril per day now. I have also sent a prescription for cetirizine (Zyrtec) which you can use daily as needed.     4) Please get your mammogram and colonoscopy done before your next visit if possible.     5) I am also ordering a sleep study to assess your sleep apnea, this may be worsening some of your symptoms.     Please return to clinic in 6-8 weeks discuss if your symptoms are improving with therapy and for vaccines.    All the best,  Dr. Morgan

## 2024-04-02 NOTE — PROGRESS NOTES
"HISTORY OF PRESENT ILLNESS:   Carol Arevalo is a 54 y.o. female  with PMH significant for HFpEF secondary to NICM (EF 55 to 60% August 2020), morbid obesity, remote history of PE, depressive symptoms, asthma, MIRI, and chronic body pain who presents to [facility] to address a number of complaints.    Pt was last seen in clinic on 02/21/2024. At this visit she had multiple musculoskeletal complaints including chest pain, neck pain, diffuse body pains and concerns for severe anxiety. She did have a CMP drawn at this visit was concerning for HAGMA (Bicarb 17, AG 13). Today she has multiple complaints.    Chest pain: She reports that this has been present for several years, but that it seems to have been more noticeable over the past 2 months. She describes it as pinpoint pressure/burning sensation in her central upper chest. States that symptoms come and go and are not present every day. Not associated with exercise or relived by rest. Notes that it is frequently relieved by tylenol. States that sometimes when she swallows food, it will exacerbate this pain. Denies usually having racing thoughts or feelings of severe anxiety when this pain comes on, but does note that it has happened before.    Neck Pain: Located in her anterior neck. Comes and goes without any clear relieving of exacerbating factors. Relieved by tylenol. She does report a distant history of someone telling her that she had \"an incidental finding\" related her thyroid in the past ~10 years ago, but does not recall the details.     Throat congestion/allergic rhinitis: Reports that symptoms are worse during the summer months and when she does extensive cleaning in the house. Reports that she takes albuterol and Claritin as needed during this time with intermittent relief. States that stuffy nose, clear drainage in throat, and shortness of breath are common symptoms for her. Patient reports that her nebulizer is broken and she has been unable to use her " albuterol. She requests a prescription for a new nebulizer.     Finger pain: Patient reports that she hit her right middle finger in a car door approximately 1 week ago. States that she has had some discoloration of the nail since. Reports that pain and swelling have improved since initial injury.      ROS: 12 point ROS negative unless as per HPI     Patient Active Problem List    Diagnosis Date Noted    Well woman exam 11/29/2023    Pelvic pressure in female 11/29/2023    Thyroid nodule 11/29/2023        Current Outpatient Medications:     acetaminophen (Tylenol) 325 mg tablet, Take 3 tablets (975 mg) by mouth every 8 hours if needed for mild pain (1 - 3) or moderate pain (4 - 6)., Disp: 30 tablet, Rfl: 3    ALBUTEROL 0.375 MG/ML CONT NEB SYR-SIMPLE, Inhale 4 times a day as needed., Disp: , Rfl:     albuterol 2.5 mg /3 mL (0.083 %) nebulizer solution, Take 3 mL (2.5 mg) by nebulization every 6 hours if needed for wheezing or shortness of breath., Disp: , Rfl:     albuterol 90 mcg/actuation inhaler, INHALE 1 PUFF BY MOUTH EVERY 4 TO 6 HOURS AS NEEDED FOR COUGH AND SHORTNESS OF BREATH, Disp: 8.5 g, Rfl: 3    carvedilol (Coreg) 25 mg tablet, Take 1 tablet (25 mg) by mouth 2 times a day., Disp: 180 tablet, Rfl: 1    cetirizine (ZyrTEC) 10 mg tablet, Take 1 tablet (10 mg) by mouth once daily as needed for allergies., Disp: 30 tablet, Rfl: 2    fluticasone (Flonase) 50 mcg/actuation nasal spray, Administer 1 spray into each nostril once daily. Shake gently. Before first use, prime pump. After use, clean tip and replace cap., Disp: 16 g, Rfl: 5    furosemide (Lasix) 40 mg tablet, Take 1 tablet (40 mg) by mouth once daily., Disp: 90 tablet, Rfl: 1    hydrALAZINE (Apresoline) 10 mg tablet, Take 1 tablet (10 mg) by mouth 2 times a day., Disp: 180 tablet, Rfl: 1    isosorbide mononitrate ER (Imdur) 30 mg 24 hr tablet, Take 1 tablet (30 mg) by mouth once daily. Do not crush or chew., Disp: 90 tablet, Rfl: 1    loratadine  (Claritin) 10 mg tablet, Take 1 tablet (10 mg) by mouth once daily., Disp: , Rfl:     losartan (Cozaar) 25 mg tablet, Take 1 tablet (25 mg) by mouth once daily., Disp: 90 tablet, Rfl: 1    nebulizers misc, Please dispense one nebulizer, Disp: 1 each, Rfl: 0    omeprazole (PriLOSEC) 40 mg DR capsule, Take 1 capsule (40 mg) by mouth once daily in the morning. Take before meals. Do not crush or chew., Disp: 30 capsule, Rfl: 3    spironolactone (Aldactone) 100 mg tablet, Take 1 tablet (100 mg) by mouth once daily., Disp: 90 tablet, Rfl: 1    No results found for this or any previous visit (from the past 96 hour(s)).       PHYSICAL EXAM:   General: pt is pleasant, cooperative, in no acute distress  HEENT: Prominent neck with palpable thyroid and borderline thyromegaly, unable to palpate any discreet nodules  Heart: RRR,  no murmur, rub or [place]  Lungs: clear to auscultation bilaterally with good airflow throughout. No wheezes or rhonchi.  Abdomen: soft, nontender, nondistended, no apparent mass  Extremities: Mild subungual hematoma present on R 3rd digit, no swelling or tenderness to palpation of digit  Skin: no rash or lesions  Psychiatric: mental status and behavior appropriate for situation   Neurologic: Normal gait and stance. Normal speech character and tone. No apparent focal deficits.       Musculoskeletal: moving all extremities appropriately.    Assessment and plan:   Carol Arevalo is a 54 y.o. female  with PMH significant for HFpEF secondary to NICM (EF 55 to 60% August 2020), morbid obesity, remote history of PE, depressive symptoms, asthma, MIRI, and chronic body pain who presents to [facility] to address a number of complaints.     #Atypical Chest pain  ::Cluster of symptoms most concerning for possible GERD.   ::Low concern for cardiac chest pain given lack of typical symptoms and normal stress test in 2020  -Start omeprazole 40 mg daily, will trial for 6-8 weeks    #Neck pain  #Thyromegaly  ::Patient notably  had palpable thyroid nodule at OB-GYN visit in 11/2023, thyroid US ordered at this visit but never obtained.  ::TSH wnl 02/2024  -Thyroid ultrasound ordered    #Metabolic acidosis  ::Bicarbonate 17 with AG 13 on RFP 02/21/2024, urine ketones wnl  ::Unclear what may be causing this, suspec possible lab error  -Repeat RFP today, if remains acidotic will send patient lab to collect lactate     #Asthma?  ::No PFTs on file  -Placed order for new nebulizer  -PFTs ordered    #Allergic rhinitis  -Start flonase 1 spray each nostril daily  -Switch from loratidine to ceterizine daily as needed    #HFpEF   #Lower extremity Edema  - c/w carvedilol 25 mg bid, hydralazine 10 mg BID (how it was prescribed in the EMR), isosorbide mononitrate 30 mg daily (patient out of medication for 2 months), spironolactone 100 mg daily  - c/w furosemide 40mg BID  - c/w Losartan 25 mg daily     # Health Maintenance  Cancer Screening  - Colonoscopy: Encouraged patient to reschedule   - Mammogram: Due now, order active encouraged to obtain  - PAP smear: Pap + HPV cotesting negative 01/2022, repeat 01/2027     - Last HbA1c: 5.9% 02/2024     Cardiovascular Screening   - ASCVD risk score: 4.3%  - STOPBANG: High risk for MIRI, sleep study ordered today     Infectious disease  - HIV: STI screening ordered in 11/2023 but never obtained collect at next visit   - Syphilis: STI screening ordered in 11/2023 but never obtained collect at next visit   - Hepatitis C: STI screening ordered in 11/2023 but never obtained collect at next visit      Vaccines   - Influenza: Encourage next fall  - Shingles: Due now give at next visit  - Tdap: Due now give at next visit  - Pneumonia: Start at age 65  - COVID: Encourage to obtain at next visit      RTC IN 6-8 weeks

## 2024-04-03 ENCOUNTER — SOCIAL WORK (OUTPATIENT)
Dept: BEHAVIORAL HEALTH | Facility: CLINIC | Age: 54
End: 2024-04-03
Payer: COMMERCIAL

## 2024-04-03 DIAGNOSIS — F40.298 OTHER SPECIFIED PHOBIA: ICD-10-CM

## 2024-04-03 DIAGNOSIS — F41.9 ANXIETY: ICD-10-CM

## 2024-04-03 PROCEDURE — 90791 PSYCH DIAGNOSTIC EVALUATION: CPT

## 2024-04-03 NOTE — PROGRESS NOTES
Therapy Session - Initial Assessment    Session Type: Virtual    Session Time  Start: 9:00 am  End: 9:55 am     Reason for Visit / Chief Complaint: Anxiety, Fear of germs      Accompanied by: Self  Guardian Status: Self  Caregiver Status: Does not have a caregiver    CHIEF COMPLAINT SUMMARY:   The patient is a 54-year-old female who presented with symptoms of anxiety. The patient said she had Covid one year ago, and since that time, she has had a fear of germs and fears of getting re-infected by Covid. She believes that germs are living in the various clothing items she wore during that time, and she has had to throw things away. The patient said that when she wore these items, she began to feel strange and started getting sick. However when she took the clothing items off, she felt better. The patient said she is very concerned about touching any germs at work or at home, and she wears gloves and a mask at work. She believes that germs are able to get into the fiber of her clothing and that it continues to live there, even after washing the items. The patient said she never had these types of concerns prior to having Covid. She said she frequently sprays alcohol on her clothing and sprays down her house with Lysol in order to stay healthy. She believes that she is able to get re-infected by germs despite her doctor telling her that the germs die after a few hours. The patient said she doesn't have any history of having mental health issues, and she said “I'm not crazy”. She denied having any suicidal or homicidal ideation. She also denied having any auditory or visual hallucinations.     HISTORY OF PRESENT ILLNESS   Current Providers:    Psychiatrist: Denied    Precipitants/Stressors: Family stress, not getting along with her son, living with her kids' father while she looks for a place of her own.    Prior mental health/substance abuse treatment: Had post-partum depression after the birth of her son, went on an  antidepressant for a short time.    Acute mental health symptoms:  Suicidal Ideation: Denied  Homicidal Ideation: Denied  Psychosis: Denied    Level of functioning impairment at work/home/school now: Mild    Substance abuse hx:  Tobacco, vaping: Denied  Alcohol: Drinks socially   Illicit drugs: Denied     Significant medical hx: Had Covid one year ago      PSYCHOSOCIAL HISTORIES  Living Environment: Lives with her 14-year-old daughter. Has 3 children - ages 14, 26 and 32. Has 5 grandchildren but she won't hold the younger ones due to her fear of germs.  Social support network: Family  Moravian/Spiritual orientation: Denied  Gender Identity and sexual orientation: Female, heterosexual  Recent losses or deaths: Denied    Education Hx:   Highest level of education: Some college     Hx of learning disability/IEP: Denied    Work Hx:  Are you currently working?  Works full-time  Occupation:   at the school      Other Childhood Experiences: Grew up in [place]. Her parents remained  until her father  20 years ago. Has one brother. The patient said her father told her once that she wasn't supposed to be born, and this bothers her a lot that he said that.      FAMILY HISTORY:  Maternal Family Psych History:  Mother: Denied  Grandmother: Denied  Grandfather: Denied                                                                   Brother: Denied  Sister: Denied    Paternal Family Psych History:  Father: Denied  Grandmother: Denied  Grandfather: Denied                                                                        Brother: Denied  Sister: Denied    Hx of Abuse/Trauma History:   Child abuse: Denied   Rape: Denied  Domestic Violence: Denied  Robbery: Denied  Near Fatal experience: Denied    Goals patient wants to work on while attending therapy:  1.To manage her symptoms of anxiety   2.To learn how to move on from being hurt by people in her life    Biggest strengths: Able to set healthy  limits    Healthy coping strategies: Separates herself, regroups    What barriers do you see interfering with your ability to attend therapy: Denied    Mental Status Exam:  General appearance & grooming: Appropriate  Motor activity:  Appropriate             Behavior: Cooperative, Distracted            Adaptive Equipment: Denied  Speech: Appropriate, Clear      Mood: Anxious     Affect: Mood Congruent, Appropriate                Thought process: Racing     Thought content: Preoccupied, Bizarre    Cognition: No impairment, Orientation: Alert & Oriented x 3  Insight: Unaware of problem    Eye contact: Average     Judgment:  Judgment impaired    Interview behavior: Appropriate    Hallucinations: None     Delusions:  Other      Impression/Diagnosis: Anxiety, Phobia    Plan: The patient is agreeable with attending Solution-Focused Therapy for approximately 8-10 sessions. Will schedule a follow-up appointment in one week.      PAULA Albarran, BECCAW-S    **This visit was performed using real-time telehealth tools, including an audio/video connection between the patient and this provider. The patient provided consent for the individual telemedicine service and understands the limitations of the visit.

## 2024-04-03 NOTE — PROGRESS NOTES
I reviewed the resident/fellow's documentation and discussed the patient with the resident/fellow. I agree with the resident/fellow's medical decision making as documented in the note.    ATTENDING TEMPLATE [facility]  NAME: Carol Arevalo  HPI:   54 y.o. female  with PMH significant for HFpEF secondary to NICM (EF 55 to 60% August 2020), morbid obesity, remote history of PE, depressive symptoms, asthma, MIRI, and chronic body pain last set of labs with bicarb of 17 and gap of 13.  She has numerous body complaints including chest pain unclear if years or months, substernal pinpoint pressure.  Not associated with exertion, presents randomly.  She did think perhaps swallowing makes it worse.  Denies acid in mouth.  She has cough.  She also has localized neck pain and on exam a full thyroid to exam.  GYN felt a nodule and recommended a thyroid ultrasound.  She does recall an old ultrasound. They talked a long time about her allergy symptoms.      PMX:  HFpEF, obesity, chronic pain, A1C=5.9%  MEDS:   ALLERGIES:   SOC HX:  FAM HX:    PE:  128/84  RECOMMEND:  1. Thyroid ultrasound re-ordered  2.  Add cetririzine, Flonase.  3.  Repeat renal function panel to see if true acidosis.  Lactate level should be done if present on repeat.  4.  Omeprazole initiated to see if helps with atypical chest pain  5.  F/U 6-8 weeks         Juani Keane MD

## 2024-04-08 DIAGNOSIS — Z12.11 COLON CANCER SCREENING: Primary | ICD-10-CM

## 2024-04-08 RX ORDER — POLYETHYLENE GLYCOL 3350, SODIUM CHLORIDE, SODIUM BICARBONATE, POTASSIUM CHLORIDE 420; 11.2; 5.72; 1.48 G/4L; G/4L; G/4L; G/4L
4000 POWDER, FOR SOLUTION ORAL ONCE
Qty: 4000 ML | Refills: 0 | Status: SHIPPED | OUTPATIENT
Start: 2024-04-08 | End: 2024-04-08

## 2024-04-08 NOTE — PROGRESS NOTES
Bowel preparation has been prescribed for patient's upcoming colonoscopy procedure.    Pihlip Redding MD  Gastroenterology

## 2024-04-09 ENCOUNTER — TELEPHONE (OUTPATIENT)
Dept: PRIMARY CARE | Facility: HOSPITAL | Age: 54
End: 2024-04-09
Payer: COMMERCIAL

## 2024-04-09 DIAGNOSIS — J45.909 ASTHMA, UNSPECIFIED ASTHMA SEVERITY, UNSPECIFIED WHETHER COMPLICATED, UNSPECIFIED WHETHER PERSISTENT (HHS-HCC): ICD-10-CM

## 2024-04-09 RX ORDER — PEN NEEDLE, DIABETIC 31 GX5/16"
NEEDLE, DISPOSABLE MISCELLANEOUS
Qty: 1 EACH | Refills: 0 | Status: SHIPPED | OUTPATIENT
Start: 2024-04-09

## 2024-04-09 NOTE — TELEPHONE ENCOUNTER
Patient asked for Nebulizer machine to be sent to [facility] can we resubmit script to [facility].

## 2024-04-23 ENCOUNTER — SOCIAL WORK (OUTPATIENT)
Dept: BEHAVIORAL HEALTH | Facility: CLINIC | Age: 54
End: 2024-04-23
Payer: COMMERCIAL

## 2024-04-23 DIAGNOSIS — F41.9 ANXIETY: ICD-10-CM

## 2024-04-23 PROCEDURE — 90837 PSYTX W PT 60 MINUTES: CPT

## 2024-04-23 NOTE — PROGRESS NOTES
Therapy Session  Progress Note    Session Type: Virtual    Start Time: 2:00 pm  End Time: 2:55 pm    Appointment: Scheduled    Reason for Visit: Anxiety, Delusions    Patient Symptoms/Interval History: Feeling less anxious, said the residue that has been on her clothing has partially gone away. Said this residue is a result of her having Covid one year ago. Believes she gets cold symptoms when she touches these clothing items, despite having washed them repeatedly. Threw her shoes out. Feels like she has been in hell but that it is now improving. Said that talking is helping. Has stress in her home since she lives with her daughter's father.     Mental Status: Alert & Oriented x3    Functional Status: Minimal impairment    Interventions Provided: Solution-Focused Therapy    Progress Made: Minimum    Response to Interventions: The patient was receptive to the interventions provided.    Treatment Plan: Use Solution-Focused Therapy to help the patient cope with feelings of anxiety.    Follow Up/Next Appointment: Follow up in 2 weeks.      PAULA Albarran, TRISHA    **This visit was performed using real-time telehealth tools, including an audio/video connection between the patient and this provider. The patient provided consent for the individual telemedicine service and understands the limitations of the visit.

## 2024-04-23 NOTE — PSYCHOTHERAPY
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    Says he's a narcissist - its always about him  Gaslighting - he flips things like its her fault    Its stressful  Doesn't want to be where she's not welcome  Mom is hard to be around    Nothing is easy  Just want a peace of mind    Focused on things sh eis able to change  Off in the summer, has a break    Feels like she was in hell.   Concerned about affecting other people  Spray the sheets with alcohol    Makes her angry and others think she's crazy, not delusional    Neighbor put down lie, inhaled it, through mask  Through her pores    No meds, just weather the storms  Penelope - multiple baths    Feels relief that she feels better    She almost cracked, too much stress  Has fun, [employer], [employer]  To put up with him

## 2024-05-07 ENCOUNTER — SOCIAL WORK (OUTPATIENT)
Dept: BEHAVIORAL HEALTH | Facility: CLINIC | Age: 54
End: 2024-05-07
Payer: COMMERCIAL

## 2024-05-07 DIAGNOSIS — F41.9 ANXIETY: ICD-10-CM

## 2024-05-07 PROCEDURE — 90837 PSYTX W PT 60 MINUTES: CPT

## 2024-05-07 NOTE — PSYCHOTHERAPY
Doesn't need to go to a psychologist, I'm not crazy  Ex- got a cold from the residue    Believes her pores are open  germs  Can trigger Covid to come back  Feels more susceptible to germs, believes her pores are open  Wears gloves - helps protect her    Residue from the handle gave him a cold  Saw grandbaby, didn't pick him up    In school - plans to wipe things down in the house  Will be on stacation, things here this summer  Going to [place], family vacation    Was saving money. Older dtr quit her job  She's paying her rent, just got another job, car broke down  It went into her finances    Important to her that dtr has a place in case   Wants her daughter to learn from her mistakes    Told her daughter - she is on her own now  Tell her ahead of time  Tries to be honest with her kids

## 2024-05-07 NOTE — PROGRESS NOTES
"Therapy Session  Progress Note    Session Type: Virtual    Start Time: 3:00 pm  End Time: 3:30 pm    Appointment: Scheduled    Reason for Visit: Anxiety    Patient Symptoms/Interval History: Feeling less anxious. Believes there is \"residue\" on surfaces that cause her to get sick. Has been wearing gloves at work and does a lot of cleaning with soap and water. Looking forward to being off of work over the summer and taking small trips. Doesn't believe that her concerns about this \"residue\" interferes with her life. Not interested in exploring the option of further evaluation by Psychiatry. Loaned some money to her daughter which she now regrets. Reports having a good relationship with her children and enjoying having a new grandchild (although she won't hold him for fear of germs).    Mental Status: Alert & Oriented x3    Functional Status: Minimal impairment    Interventions Provided: Values Exploration, Review Progress/Goals    Progress Made: Moderate    Response to Interventions: The patient was receptive to the interventions provided.    Action Plan/Homework: The patient feels that her goals for therapy have been met and she would like to discharge from therapy.    Treatment Plan: N/A    Follow Up/Next Appointment: No follow-up appointment was scheduled at this time. The patient has the phone number to call if she would like to schedule an appointment in the future.      PAULA Albarran, MALICK-S    **This visit was performed using real-time telehealth tools, including an audio/video connection between the patient and this provider. The patient provided consent for the individual telemedicine service and understands the limitations of the visit.  "

## 2024-05-30 ENCOUNTER — APPOINTMENT (OUTPATIENT)
Dept: PRIMARY CARE | Facility: HOSPITAL | Age: 54
End: 2024-05-30
Payer: COMMERCIAL

## 2024-06-04 ENCOUNTER — OFFICE VISIT (OUTPATIENT)
Dept: PRIMARY CARE | Facility: HOSPITAL | Age: 54
End: 2024-06-04
Payer: COMMERCIAL

## 2024-06-04 VITALS
OXYGEN SATURATION: 99 % | DIASTOLIC BLOOD PRESSURE: 74 MMHG | BODY MASS INDEX: 42.65 KG/M2 | WEIGHT: 256 LBS | HEART RATE: 73 BPM | HEIGHT: 65 IN | SYSTOLIC BLOOD PRESSURE: 145 MMHG

## 2024-06-04 DIAGNOSIS — J45.909 ASTHMA, UNSPECIFIED ASTHMA SEVERITY, UNSPECIFIED WHETHER COMPLICATED, UNSPECIFIED WHETHER PERSISTENT (HHS-HCC): ICD-10-CM

## 2024-06-04 DIAGNOSIS — I50.32 CHRONIC DIASTOLIC CONGESTIVE HEART FAILURE (MULTI): ICD-10-CM

## 2024-06-04 PROCEDURE — 99215 OFFICE O/P EST HI 40 MIN: CPT | Mod: GC

## 2024-06-04 PROCEDURE — 99215 OFFICE O/P EST HI 40 MIN: CPT

## 2024-06-04 PROCEDURE — 3008F BODY MASS INDEX DOCD: CPT

## 2024-06-04 PROCEDURE — 1036F TOBACCO NON-USER: CPT

## 2024-06-04 RX ORDER — CARVEDILOL 25 MG/1
25 TABLET ORAL 2 TIMES DAILY
Qty: 180 TABLET | Refills: 1 | Status: SHIPPED | OUTPATIENT
Start: 2024-06-04 | End: 2024-12-01

## 2024-06-04 RX ORDER — ISOSORBIDE MONONITRATE 30 MG/1
30 TABLET, EXTENDED RELEASE ORAL DAILY
Qty: 90 TABLET | Refills: 1 | Status: SHIPPED | OUTPATIENT
Start: 2024-06-04 | End: 2024-12-01

## 2024-06-04 ASSESSMENT — PATIENT HEALTH QUESTIONNAIRE - PHQ9
1. LITTLE INTEREST OR PLEASURE IN DOING THINGS: NOT AT ALL
SUM OF ALL RESPONSES TO PHQ9 QUESTIONS 1 AND 2: 0
2. FEELING DOWN, DEPRESSED OR HOPELESS: NOT AT ALL

## 2024-06-04 ASSESSMENT — ENCOUNTER SYMPTOMS
OCCASIONAL FEELINGS OF UNSTEADINESS: 0
LOSS OF SENSATION IN FEET: 0
DEPRESSION: 0

## 2024-06-04 ASSESSMENT — PAIN SCALES - GENERAL: PAINLEVEL: 0-NO PAIN

## 2024-06-04 NOTE — PROGRESS NOTES
I saw and evaluated the patient. I personally obtained the key and critical portions of the history and physical exam or was physically present for key and critical portions performed by the resident/fellow. I reviewed the resident/fellow's documentation and discussed the patient with the resident/fellow. I agree with the resident/fellow's medical decision making as documented in the note.    Clarisse Jensen MD

## 2024-06-04 NOTE — PROGRESS NOTES
Chief complaint: Routine follow-up    HPI:  Carol Arevalo is a 54 y.o. female with pmh of HF secondary to NICM (EF 55 to 60% August 2020), morbid obesity, remote history of PE, depressive symptoms, asthma, MIRI, and chronic body pain presenting for routine follow-up.     Per the patient: She states that everything is fine. She is having issues with her car, which is her means of getting around to work. She also has issues with a part in her car that would cost her $300, but it is not worth putting that much money into the vehicle. She states that she is not feeling chest pain, which she is associating with phlegm. She endorses phlegm every day even when sleeping and feels like it is in her throat. She states it is cloudy, thick, clear. She produced some phlegm during the interview, which was clear, thick, non-purulent, and without blood. She has not started using the Flonase. She took the Zyrtec once, stated that it helped, but has not taken it again. She has not had to use her albuterol inhaler. She felt like Mucinex was helping break up the mucus.     She takes the omeprazole almost regularly, and it helps her to swallow. She states that she has been having issues swallowing ever since her last pregnancy. She feels like she is about to choke (getting stuck sensation). For example, in the past, she used to have hold water in her mouth and then swallow. Now with the omeprazole, she does not have to do that. Denies acid reflux, denies metallic taste in her mouth. Also endorses something in the throat for the last 6 months that is hurting on the left. The pain gradually comes and then goes away. It does not hurt to swallow (the pain is anterior to it when she does swallow). She denies chest pain with swallowing. She denies back pain with swallowing (endorses back pain that is related to her menses).     She also endorses RUQ pain for the past year. She states that she has a history of gallstones. She has to drink  milk to water to sooth the pain. She was concerned about fungus in her toes and dark spots in the feet.     She has lost 4 pounds since her last visit; she has been intentionally trying to lose weight.     Per chart review:  During her visit in February 2024, labs were ordered. She was informed about a mammogram. She endorsed several symptoms related to anxiety and repetitive behaviors in the setting of a previous COVID-19 infection, and she was referred to psychology to help with her symptoms. She has been seeing social work for her anxiety symptoms. Her initial labs results were concerning for a metabolic acidosis, which is negative on repeat work-up. She was then seen in April 2024. A thyroid ultrasound was re-ordered. Due to allergic symptoms, cetirizine and Flonase were added on. Omeprazole was initiated to see if it helps with atypical chest pain. Sleep study was ordered, and she is scheduled for a sleep study on 6/13/24. She is also scheduled to see the GI doctors in July for a colonoscopy. Pulmonary function tests were ordered at the last visit.     Medications:  Current Outpatient Medications   Medication Instructions    acetaminophen (TYLENOL) 975 mg, oral, Every 8 hours PRN    ALBUTEROL 0.375 MG/ML CONT NEB SYR-SIMPLE inhalation, 4 times daily PRN    albuterol 90 mcg/actuation inhaler INHALE 1 PUFF BY MOUTH EVERY 4 TO 6 HOURS AS NEEDED FOR COUGH AND SHORTNESS OF BREATH    albuterol 2.5 mg, nebulization, Every 6 hours PRN    carvedilol (COREG) 25 mg, oral, 2 times daily    cetirizine (ZYRTEC) 10 mg, oral, Daily PRN    fluticasone (Flonase) 50 mcg/actuation nasal spray 1 spray, Each Nostril, Daily, Shake gently. Before first use, prime pump. After use, clean tip and replace cap.    furosemide (LASIX) 40 mg, oral, Daily    hydrALAZINE (APRESOLINE) 10 mg, oral, 2 times daily    isosorbide mononitrate ER (IMDUR) 30 mg, oral, Daily, Do not crush or chew.    losartan (COZAAR) 25 mg, oral, Daily    nebulizers  misc Please dispense one nebulizer    omeprazole (PRILOSEC) 40 mg, oral, Daily before breakfast, Do not crush or chew.    spironolactone (ALDACTONE) 100 mg, oral, Daily     Allergies:  Allergies   Allergen Reactions    Naproxen Dizziness     Past medical history:  Past Medical History:   Diagnosis Date    Acute sinusitis, unspecified 11/10/2016    Subacute sinusitis, unspecified location    Acute streptococcal tonsillitis, unspecified 04/13/2017    Strep tonsillitis    Cardiomyopathy, unspecified (Multi) 09/14/2021    Cardiomyopathy    Cervicalgia 05/26/2017    Spine pain, cervical    Encounter for gynecological examination (general) (routine) without abnormal findings 05/31/2016    Encounter for annual routine gynecological examination    Encounter for gynecological examination (general) (routine) without abnormal findings 06/10/2016    Well woman exam with routine gynecological exam    Encounter for other general counseling and advice on contraception 05/31/2016    Counseling for birth control, intrauterine device    Encounter for other screening for malignant neoplasm of breast 11/27/2017    Screening for breast cancer    Encounter for screening for infections with a predominantly sexual mode of transmission 05/31/2016    Routine screening for STI (sexually transmitted infection)    Encounter for screening for infections with a predominantly sexual mode of transmission 03/24/2015    Routine screening for STI (sexually transmitted infection)    Encounter for screening for infections with a predominantly sexual mode of transmission     Screening for STDs (sexually transmitted diseases)    Encounter for screening for malignant neoplasm of cervix 01/14/2022    Cervical cancer screening    Essential (primary) hypertension     Chronic hypertension    Morbid (severe) obesity due to excess calories (Multi) 03/20/2018    Obesity, morbid (more than 100 lbs over ideal weight or BMI > 40)    Other chest pain 05/04/2017     Atypical chest pain    Other conditions influencing health status     History of vaginal delivery    Other disorders of electrolyte and fluid balance, not elsewhere classified 10/15/2016    Electrolyte and fluid disorder    Other problems related to lifestyle 05/31/2016    Other problems related to lifestyle    Other problems related to lifestyle     Other problems related to lifestyle    Other pulmonary embolism without acute cor pulmonale (Multi) 12/18/2020    Pulmonary embolism    Personal history of other benign neoplasm 08/30/2016    History of uterine leiomyoma    Personal history of other diseases of the circulatory system     History of congestive heart failure    Personal history of other diseases of the circulatory system 08/27/2013    History of sinus tachycardia    Personal history of other diseases of the digestive system 05/23/2017    History of esophageal reflux    Personal history of other diseases of the musculoskeletal system and connective tissue 10/13/2017    History of muscle pain    Personal history of other diseases of the respiratory system     History of sore throat    Personal history of other diseases of the respiratory system 11/10/2016    History of sinusitis    Personal history of other specified conditions 10/15/2016    History of dizziness    Personal history of other specified conditions 09/03/2015    History of bradycardia    Polyp of cervix uteri 02/11/2022    Cervical polyp    Unspecified asthma, uncomplicated ([facility]) 12/22/2021    Asthma     Surgical history:  Past Surgical History:   Procedure Laterality Date    OTHER SURGICAL HISTORY  03/24/2015    Biopsy Vulvar     Family history:  No family history on file.    Social history:   reports that she has never smoked. She has never used smokeless tobacco. She reports current alcohol use. She reports that she does not currently use drugs.    Health maintenance:  Health Maintenance   Topic Date Due    Yearly Adult Physical  Never  "done    HIV Screening  Never done    Colorectal Cancer Screening  Never done    MMR Vaccines (1 of 1 - Standard series) Never done    Hepatitis C Screening  Never done    Hepatitis B Vaccines (1 of 3 - 19+ 3-dose series) Never done    DTaP/Tdap/Td Vaccines (1 - Tdap) 2006    Pneumococcal Vaccine: Pediatrics (0 to 5 Years) and At-Risk Patients (6 to 64 Years) (2 of 2 - PCV) 11/10/2016    Zoster Vaccines (1 of 2) Never done    Echocardiogram  08/10/2022    COVID-19 Vaccine (1 -  season) Never done    Mammogram  10/25/2023    Influenza Vaccine (Season Ended) 2024    Cervical Cancer Screening  2025    Diabetes: Hemoglobin A1C  2025    Diabetes Screening  2025    Creatinine Level  2025    Potassium Level  2025    Lipid Panel  2029    HIB Vaccines  Aged Out    IPV Vaccines  Aged Out    Hepatitis A Vaccines  Aged Out    Meningococcal Vaccine  Aged Out    Rotavirus Vaccines  Aged Out    HPV Vaccines  Aged Out    Irritable Bowel Syndrome  Discontinued     Sexual History  Menstrual History:  OB History          3    Para   3    Term   3            AB        Living   3         SAB        IAB        Ectopic        Multiple        Live Births   3                LMP: Usually regular last year. No period in January, February, or May. Had a period in March and April.     Review of systems:  As per HPI    Vitals:  Visit Vitals  /74   Pulse 73   Ht 1.651 m (5' 5\")   Wt 116 kg (256 lb)   SpO2 99%   BMI 42.60 kg/m²   OB Status Having periods   Smoking Status Never   BSA 2.31 m²      Physical exam:  GEN: Awake, alert, no acute distress  HEAD: Normocephalic, atraumatic  NECK: No discoloration noted in the neck  CV: Normal S1 and S2, no murmurs, rubs, or gallops  PULM: CTA bilaterally  ABD: Soft, nondistended, tender in the right upper quadrant   BACK: No spinous tenderness or paraspinal muscle tenderness  EXTREM: No peripheral edema  SKIN and NAILS: No yellow " "discoloration noted in the nail beds of the bilateral toes. Dark macules noted in the bilateral feet.   NEURO: Grossly intact, no focal neurological deficits    Labs:  Lab Results   Component Value Date    WBC 6.1 06/24/2022    HGB 12.1 06/24/2022    HCT 39.3 06/24/2022    MCV 86 06/24/2022     06/24/2022     Lab Results   Component Value Date    GLUCOSE 76 04/02/2024    CALCIUM 9.5 04/02/2024     04/02/2024    K 5.0 04/02/2024    CO2 26 04/02/2024     04/02/2024    BUN 13 04/02/2024    CREATININE 0.82 04/02/2024     Lab Results   Component Value Date    HGBA1C 5.9 (H) 02/21/2024      Lab Results   Component Value Date    CHOL 174 02/21/2024    CHOL 137 05/12/2023     Lab Results   Component Value Date    HDL 52.2 02/21/2024    HDL 48.5 05/12/2023     Lab Results   Component Value Date    LDLCALC 101 (H) 02/21/2024     Lab Results   Component Value Date    TRIG 105 02/21/2024    TRIG 107 05/12/2023     No components found for: \"CHOLHDL\"    Imaging:  No results found.    Assessment and plan:  Carol Arevalo is a 54 y.o. female with PMHx of CHF secondary to an ICM (EF 55 to 60% August 2020), morbid obesity, remote history of PE, depressive symptoms, asthma, MIRI, and chronic body pain presenting for routine follow-up. Presentation consistent with GERD and allergic rhinitis.     #GERD  #Atypical Chest pain (improved)  #Trouble swallowing  - Cluster of symptoms most concerning for possible GERD.   - Low concern for cardiac chest pain given lack of typical symptoms and normal stress test in 2020  Plan:   - Continue with omeprazole  - Monitor symptoms and pending thyroid ultrasound (see below).     #HFpEF (55-60%)  #Orthostatic Hypotension  - NYHA class II Stage C HF, EF 55% in 2021  - Lipid panel: Chol 174, HDL-c 52.2, LDL-c: 101, TRIG 105.   Plan:  - c/w carvedilol 25 mg bid (refilled), hydralazine 10 mg BID, isosorbide mononitrate 30 mg daily (refilled), spironolactone 100 mg daily  - c/w furosemide " 40mg BID  - c/w Losartan 25 mg daily   - Patient had aspirin written in her chart.  She states that she used to be on aspirin, but she has not had aspirin for a year.  Per chart review it appears she has nonischemic cardiomyopathy.  Will not be giving aspirin at this time.  - Needs follow-up with cardiology    #HTN   - Continue carvedilol, hydralazine, isosorbide mononitrate, losartan as prescribed    #Breast tenderness  #Fibrocystic breast changes?  - Patient with cyclical breast pain related to periods.   - Mammogram Oct 2022: negative   Plan:  - Pending mammogram     #Thyroid Nodule  #Thyromegaly  #Neck pain  - Had a thyroid nodule noticed on exam at OB/GYN office  - TSH with reflex to T4 within normal limits   Plan:  - Counseled patient to get thyroid ultrasound  - Pending results, may need further work up if swallowing issues persist    #Pelvic pressure  - U/S ordered by OB/GYN, but it was not completed. Patient notified about pelvic ultrasound.     #RUQ pain  - Patient with right upper quadrant pain for the last year  - RUQ US (2022): Cholelithiasis without evidence of acute cholecystitis  - Uses milk and water to sooth the pain  Plan:  - Monitor symptoms; does not appear to be having biliary colic    #Asthma?  #Allergic rhinitis  - No PFTs on file  Plan:  - Placed order for new nebulizer during the last visit  - PFTs ordered at last visit  - Continue with Flonase 1 spray each nostril daily. Take regularly.   - C/w cetirizine daily as needed. Take regularly.   - Patient counseled on using albuterol inhaler for shortness of breath, wheezing    #Sleep apnea  - Referred to sleep medicine group at the last visit to repeat sleep study.    - STOPBANG: High risk for MIRI  Plan:  - Sleep study ordered at last visit; will be completed in June      #Musculoskeletal back pain  #Aches and Pains  - Right latissimus dorsi pain  - Patient has job which requires lifting a lot of objects off ground  - Patient endorsing aches  and pains as well as nonspecific symptoms after her COVID-19 infection   Plan:   - Continue tylenol prn. Continue with lidocaine patches.     #Repetitive behaviors related to COVID-19 infection  - At visit in 2/2024, patient endorsed some repetitive behaviors regarding COVID-19 infection  - Believes her garments were infected with COVID-19 as she felt unwell when wearing them. She has thrown those out.   Plan:  - Follow with psychology/SW as appropriate  - Monitor symptoms      #Health Maintenance  - Immunizations: Influenza (due this season), Tdap (order placed, patient had to leave before getting the shot, will return for a nurse visit), COVID (due), PPV23 (given 2015, due for PCV20). Shingles (due -> patient wanted information regarding the Shingles shot, which was given). Patient states that she has gotten her childhood vaccines.   - Screening:  - Cardiovascular: The 10-year ASCVD risk score (Lindsay DK, et al., 2019) is: 4.3%    Values used to calculate the score:      Age: 54 years      Sex: Female      Is Non- : Yes      Diabetic: No      Tobacco smoker: No      Systolic Blood Pressure: 128 mmHg      Is BP treated: Yes      HDL Cholesterol: 52.2 mg/dL      Total Cholesterol: 174 mg/dL   - Diabetes: 5.9%, encourage lifestyle activities  - Cancer: Breast (Due for Mammogram, Cervical (Pap smear due in 2027), Colorectal (colonoscopy scheduled for July 2024), Lung (Not indicated)  - Infectious Disease: Screened for HIV and Hep in 2016  - Osteoporosis: Not currently indicated     Plan     Follow-up in 3 months.    Patient and plan discussed with attending physician, Dr. Jensen.     MD [facility]

## 2024-06-04 NOTE — PATIENT INSTRUCTIONS
Dear Ms. Arevalo,     As discussed today, our plan is:     Labs - We did not collect any labs.   Medication changes: We did not make any changes to your medications. Please take Flonase and Zyrtec regularly. Use the albuterol inhaler if you are experiencing shortness of breath. I refilled some of your medications at your preferred pharmacy. Please let us know if there are any issues.   Consultations - you were referred to see the following specialists:  None; You should receive a call from central scheduling in the next few days if you do not receive a call within 3-5 business days please call [phone] to schedule your appointment.   4.   If you smoke or use other tobacco products, take steps to quit. Call [phone] for more information or to set up an appointment with  [facility]. The [place] Tobacco Quit Line is a free resource for people who don’t have insurance, receive Medicaid, pregnant women, or members of the [place] [facility]. Call 5-997-QUIT-NOW or 1-965.199.8661.  5.   Please get the following tests completed: mammogram, thyroid ultrasound, pelvic ultrasound, sleep study, and colonoscopy and pulmonary function tests.   6.   You received the tetanus shot today.     Please come back to see us in: 3 months.    ------  If you have any problems or questions, please contact the clinic at [phone] to leave a message. Our fax number is [phone]. If your issue cannot wait until the next business day, please go to urgent care or the emergency department.     I also strongly urge all of my patients to register for [employer] by going to: [email or web address]  (The  staff can also send you a text/email link to register when you check out).    No shows: It is understandable if you are unable to make it to a visit, but please cancel your appointment instead of not showing up. This helps to give other patients access to primary care and keeps  wait times low.      [facility]   [phone]

## 2024-06-13 ENCOUNTER — PROCEDURE VISIT (OUTPATIENT)
Dept: SLEEP MEDICINE | Facility: HOSPITAL | Age: 54
End: 2024-06-13
Payer: COMMERCIAL

## 2024-06-13 DIAGNOSIS — E66.01 CLASS 3 SEVERE OBESITY WITH BODY MASS INDEX (BMI) OF 40.0 TO 44.9 IN ADULT, UNSPECIFIED OBESITY TYPE, UNSPECIFIED WHETHER SERIOUS COMORBIDITY PRESENT (MULTI): ICD-10-CM

## 2024-06-13 DIAGNOSIS — G47.33 OBSTRUCTIVE SLEEP APNEA (ADULT) (PEDIATRIC): ICD-10-CM

## 2024-06-13 DIAGNOSIS — R06.83 SNORING: ICD-10-CM

## 2024-06-13 PROCEDURE — 95810 POLYSOM 6/> YRS 4/> PARAM: CPT | Performed by: STUDENT IN AN ORGANIZED HEALTH CARE EDUCATION/TRAINING PROGRAM

## 2024-06-14 VITALS
HEIGHT: 65 IN | OXYGEN SATURATION: 99 % | SYSTOLIC BLOOD PRESSURE: 106 MMHG | DIASTOLIC BLOOD PRESSURE: 74 MMHG | BODY MASS INDEX: 42.79 KG/M2 | WEIGHT: 256.84 LBS | RESPIRATION RATE: 12 BRPM

## 2024-06-14 NOTE — PROGRESS NOTES
[facility] TECH NOTE:     Patient: Carol Arevalo   MRN//AGE: [id number]  1970  54 y.o.   Technologist: NICOLASA GEORGE   Room: 1   Service Date: 2024        Sleep Testing Location: [place] [address]    [place]: 10    TECHNOLOGIST SLEEP STUDY PROCEDURE NOTE:   This sleep study is being conducted according to the policies and procedures outlined by the AAS accreditation standards.  The sleep study procedure and processes involved during this appointment was explained to the patient/patient’s family, questions were answered. The patient/family verbalized understanding.      The patient is a 54 y.o. year old female scheduled for a Diagnostic PSG with montage of:   PSG Master shared . she arrived for her appointment.      The study that was ultimately completed was a Diagnostic PSG  with montage of:   PSG Master shared .    The full study Was completed.  Patient questionnaires completed?: yes     Consents signed? yes    Initial Fall Risk Screening:     Carol has not fallen in the last 6 months. her did not result in injury. Carol does not have a fear of falling. She does not need assistance with sitting, standing, or walking. she does not need assistance walking in her home. she does not need assistance in an unfamiliar setting. The patient is notusing an assistive device.     Brief Study observations: Patient arrived around 8pm for study alone. She does complain of some leg discomfort before bed. She also sleeps elevated at home for comfort from SOB. Patient declined PAP consent.    Deviation to order/protocol and reason: N/A      If PAP, which was preferred mask/pressure/mode: N/A      Other:None    After the procedure, the patient/family was informed to ensure followup with ordering clinician for testing results.      Technologist: NICOLASA GEORGE

## 2024-06-17 DIAGNOSIS — G47.33 OSA (OBSTRUCTIVE SLEEP APNEA): Primary | ICD-10-CM

## 2024-06-17 NOTE — PROGRESS NOTES
Called patient to inform her about moderate MIRI noted on sleep study completed 06/13. There was no answer, so sent patient a secure message on [employer]. Placed referral to sleep medicine for CPAP initiation.

## 2024-07-03 DIAGNOSIS — K21.9 GASTRO-ESOPHAGEAL REFLUX DISEASE WITHOUT ESOPHAGITIS: ICD-10-CM

## 2024-07-03 RX ORDER — OMEPRAZOLE 20 MG/1
20 CAPSULE, DELAYED RELEASE ORAL DAILY
Qty: 90 CAPSULE | Refills: 0 | OUTPATIENT
Start: 2024-07-03

## 2024-07-19 ENCOUNTER — TELEPHONE (OUTPATIENT)
Dept: INTERNAL MEDICINE | Facility: HOSPITAL | Age: 54
End: 2024-07-19
Payer: COMMERCIAL

## 2024-07-19 DIAGNOSIS — G47.33 OBSTRUCTIVE SLEEP APNEA: Primary | ICD-10-CM

## 2024-07-19 NOTE — TELEPHONE ENCOUNTER
I called the patient in regards to her sleep study, which showed moderate obstructive sleep apnea. I brought up using a CPAP machine to help with this and to help long term. The patient was amenable to starting CPAP (she used it in the past several years ago). Referral to sleep medicine placed.

## 2024-08-02 DIAGNOSIS — J45.909 UNSPECIFIED ASTHMA, UNCOMPLICATED (HHS-HCC): ICD-10-CM

## 2024-08-05 DIAGNOSIS — J45.20 MILD INTERMITTENT ASTHMA, UNSPECIFIED WHETHER COMPLICATED (HHS-HCC): Primary | ICD-10-CM

## 2024-08-05 RX ORDER — ALBUTEROL SULFATE 0.83 MG/ML
2.5 SOLUTION RESPIRATORY (INHALATION) EVERY 6 HOURS PRN
Qty: 75 ML | Refills: 1 | Status: SHIPPED | OUTPATIENT
Start: 2024-08-05

## 2024-08-05 RX ORDER — ALBUTEROL SULFATE 0.83 MG/ML
SOLUTION RESPIRATORY (INHALATION)
Qty: 300 ML | Refills: 4 | OUTPATIENT
Start: 2024-08-05

## 2024-08-28 DIAGNOSIS — I50.32 CHRONIC DIASTOLIC CONGESTIVE HEART FAILURE (MULTI): ICD-10-CM

## 2024-08-28 RX ORDER — HYDRALAZINE HYDROCHLORIDE 10 MG/1
10 TABLET, FILM COATED ORAL 2 TIMES DAILY
Qty: 180 TABLET | Refills: 1 | Status: SHIPPED | OUTPATIENT
Start: 2024-08-28 | End: 2025-02-24

## 2024-08-28 RX ORDER — SPIRONOLACTONE 100 MG/1
100 TABLET, FILM COATED ORAL DAILY
Qty: 90 TABLET | Refills: 1 | Status: SHIPPED | OUTPATIENT
Start: 2024-08-28 | End: 2025-02-24

## 2024-08-28 RX ORDER — FUROSEMIDE 40 MG/1
40 TABLET ORAL DAILY
Qty: 90 TABLET | Refills: 1 | Status: SHIPPED | OUTPATIENT
Start: 2024-08-28 | End: 2025-02-24

## 2024-08-28 RX ORDER — LOSARTAN POTASSIUM 25 MG/1
25 TABLET ORAL DAILY
Qty: 90 TABLET | Refills: 1 | Status: SHIPPED | OUTPATIENT
Start: 2024-08-28 | End: 2025-02-24

## 2024-08-28 RX ORDER — CARVEDILOL 25 MG/1
25 TABLET ORAL 2 TIMES DAILY
Qty: 180 TABLET | Refills: 1 | Status: SHIPPED | OUTPATIENT
Start: 2024-08-28 | End: 2025-02-24

## 2024-08-29 ENCOUNTER — OFFICE VISIT (OUTPATIENT)
Dept: PRIMARY CARE | Facility: HOSPITAL | Age: 54
End: 2024-08-29
Payer: COMMERCIAL

## 2024-08-29 VITALS
HEART RATE: 72 BPM | HEIGHT: 65 IN | SYSTOLIC BLOOD PRESSURE: 113 MMHG | WEIGHT: 230 LBS | DIASTOLIC BLOOD PRESSURE: 83 MMHG | OXYGEN SATURATION: 99 % | BODY MASS INDEX: 38.32 KG/M2 | TEMPERATURE: 97.7 F

## 2024-08-29 DIAGNOSIS — R13.10 DYSPHAGIA, UNSPECIFIED TYPE: ICD-10-CM

## 2024-08-29 DIAGNOSIS — E04.1 THYROID NODULE: ICD-10-CM

## 2024-08-29 DIAGNOSIS — R06.00 DYSPNEA, UNSPECIFIED TYPE: ICD-10-CM

## 2024-08-29 DIAGNOSIS — I50.9 HEART FAILURE, UNSPECIFIED HF CHRONICITY, UNSPECIFIED HEART FAILURE TYPE (MULTI): ICD-10-CM

## 2024-08-29 DIAGNOSIS — Z13.9 SCREENING DUE: Primary | ICD-10-CM

## 2024-08-29 DIAGNOSIS — J45.909 ASTHMA, UNSPECIFIED ASTHMA SEVERITY, UNSPECIFIED WHETHER COMPLICATED, UNSPECIFIED WHETHER PERSISTENT (HHS-HCC): ICD-10-CM

## 2024-08-29 DIAGNOSIS — R73.03 PREDIABETES: ICD-10-CM

## 2024-08-29 DIAGNOSIS — K21.9 GASTRO-ESOPHAGEAL REFLUX DISEASE WITHOUT ESOPHAGITIS: ICD-10-CM

## 2024-08-29 DIAGNOSIS — F41.9 ANXIETY: ICD-10-CM

## 2024-08-29 PROCEDURE — 3008F BODY MASS INDEX DOCD: CPT

## 2024-08-29 PROCEDURE — 99214 OFFICE O/P EST MOD 30 MIN: CPT | Mod: GC

## 2024-08-29 PROCEDURE — 99214 OFFICE O/P EST MOD 30 MIN: CPT

## 2024-08-29 RX ORDER — ALBUTEROL SULFATE 90 UG/1
2 INHALANT RESPIRATORY (INHALATION) EVERY 4 HOURS PRN
Qty: 8.5 G | Refills: 0 | Status: SHIPPED | OUTPATIENT
Start: 2024-08-29 | End: 2025-08-29

## 2024-08-29 RX ORDER — OMEPRAZOLE 40 MG/1
40 CAPSULE, DELAYED RELEASE ORAL 2 TIMES DAILY
Qty: 30 CAPSULE | Refills: 2 | Status: SHIPPED | OUTPATIENT
Start: 2024-08-29 | End: 2024-09-05

## 2024-08-29 ASSESSMENT — COLUMBIA-SUICIDE SEVERITY RATING SCALE - C-SSRS
2. HAVE YOU ACTUALLY HAD ANY THOUGHTS OF KILLING YOURSELF?: NO
1. IN THE PAST MONTH, HAVE YOU WISHED YOU WERE DEAD OR WISHED YOU COULD GO TO SLEEP AND NOT WAKE UP?: NO
6. HAVE YOU EVER DONE ANYTHING, STARTED TO DO ANYTHING, OR PREPARED TO DO ANYTHING TO END YOUR LIFE?: NO

## 2024-08-29 ASSESSMENT — PATIENT HEALTH QUESTIONNAIRE - PHQ9
2. FEELING DOWN, DEPRESSED OR HOPELESS: NOT AT ALL
1. LITTLE INTEREST OR PLEASURE IN DOING THINGS: NOT AT ALL
SUM OF ALL RESPONSES TO PHQ9 QUESTIONS 1 AND 2: 0

## 2024-08-29 ASSESSMENT — ENCOUNTER SYMPTOMS
OCCASIONAL FEELINGS OF UNSTEADINESS: 0
DEPRESSION: 0
LOSS OF SENSATION IN FEET: 0

## 2024-08-29 ASSESSMENT — PAIN SCALES - GENERAL: PAINLEVEL: 0-NO PAIN

## 2024-08-29 NOTE — PATIENT INSTRUCTIONS
As discussed today, our plan is:     Labs - we collected labs today and will call you with any abnormal results. If you have any questions or concerns prior to us calling feel free to call our office to have your questions addressed.   Imaging ordered: thyroid US, pelvic US  Procedure ordered: colonoscopy and mammogram for screening, PFTs  Medication changes: Increased omeprazole to twice daily  Consultations - you were referred to see the following specialists: Psychology and sleep medicine.You should receive a call from central scheduling in the next few days if you do not receive a call within 3-5 business days please call [phone] to schedule your appointment.     Please come back to see us in: 4 weeks   ------  If you have any problems or questions, please contact the clinic at [phone] to leave a message. Our fax number is [phone]. If your issue cannot wait until the next business day, please go to urgent care or the emergency department.     I also strongly urge all of my patients to register for [employer] by going to: [email or web address]  (The  staff can also send you a text/email link to register when you check out).    No shows: It is understandable if you are unable to make it to a visit, but please cancel your appointment instead of not showing up. This helps to give other patients access to primary care and keeps wait times low.        [facility]   [phone]

## 2024-08-29 NOTE — PROGRESS NOTES
HPI:  Carol Arevalo is a 54 y.o. female with PMHx of CHF secondary to an ICM (EF 55 to 60% August 2020), morbid obesity, remote history of PE, depressive symptoms, asthma, MIRI, and chronic body pain presenting for routine follow-up.      Patient was last seen in [facility] 6/4/24 for GERD and allergic sinusitis.  Patient is presenting today for occasional chest pain (3/10) that usually happens once she wake up from sleep. It is not severe enough to wake her up. It is not associated with weight loss, melena/ hematochezia either. Pain is not related to activity.   Ms. Arevalo also reports having abdominal discomfort, not constipation or diarrhea but her pain improves after passing BM.  She has been taking her meds regularly and reports that omeprazole helped a bit with her sxs.  She describes dysphagia to liquids before that has improved with taking omeprazole.   Ms. Arevalo also states that she lost her last AVS thus was not able to complete the requested tests.   She is also trying to lose weight and was able to lose 30lbs.     She has been socially challenged lately, having to stay currently with her mom.      Patient does not smoke, drinks alcohol only occasionally and does not use other drugs.     Medications:    Current Medications      Current Outpatient Medications:     acetaminophen (Tylenol) 325 mg tablet, Take 3 tablets (975 mg) by mouth every 8 hours if needed for mild pain (1 - 3) or moderate pain (4 - 6)., Disp: 30 tablet, Rfl: 3    ALBUTEROL 0.375 MG/ML CONT NEB SYR-SIMPLE, Inhale 4 times a day as needed., Disp: , Rfl:     albuterol 2.5 mg /3 mL (0.083 %) nebulizer solution, Take 3 mL (2.5 mg) by nebulization every 6 hours if needed for wheezing or shortness of breath., Disp: 75 mL, Rfl: 1    albuterol 90 mcg/actuation inhaler, INHALE 1 PUFF BY MOUTH EVERY 4 TO 6 HOURS AS NEEDED FOR COUGH AND SHORTNESS OF BREATH, Disp: 8.5 g, Rfl: 3    carvedilol (Coreg) 25 mg tablet, Take 1 tablet (25 mg) by mouth 2 times  a day., Disp: 180 tablet, Rfl: 1    cetirizine (ZyrTEC) 10 mg tablet, Take 1 tablet (10 mg) by mouth once daily as needed for allergies., Disp: 30 tablet, Rfl: 2    fluticasone (Flonase) 50 mcg/actuation nasal spray, Administer 1 spray into each nostril once daily. Shake gently. Before first use, prime pump. After use, clean tip and replace cap., Disp: 16 g, Rfl: 5    furosemide (Lasix) 40 mg tablet, Take 1 tablet (40 mg) by mouth once daily., Disp: 90 tablet, Rfl: 1    hydrALAZINE (Apresoline) 10 mg tablet, Take 1 tablet (10 mg) by mouth 2 times a day., Disp: 180 tablet, Rfl: 1    isosorbide mononitrate ER (Imdur) 30 mg 24 hr tablet, Take 1 tablet (30 mg) by mouth once daily. Do not crush or chew., Disp: 90 tablet, Rfl: 1    losartan (Cozaar) 25 mg tablet, Take 1 tablet (25 mg) by mouth once daily., Disp: 90 tablet, Rfl: 1    nebulizers misc, Please dispense one nebulizer, Disp: 1 each, Rfl: 0    omeprazole (PriLOSEC) 40 mg DR capsule, Take 1 capsule (40 mg) by mouth once daily in the morning. Take before meals. Do not crush or chew., Disp: 30 capsule, Rfl: 3    spironolactone (Aldactone) 100 mg tablet, Take 1 tablet (100 mg) by mouth once daily., Disp: 90 tablet, Rfl: 1        Allergies:  RX Allergies        Allergies   Allergen Reactions    Naproxen Dizziness            Review of systems:  Constitutional: negative for fevers, chills, weight loss, weight gain, change in appetite, fatigue, weakness.  HEENT: negative for headache, changes in vision or hearing, congestion, sore throat.  Respiratory: negative for SOB, cough, hemoptysis, wheezing  Cardiovascular: negative for chest pain, palpitations, orthopnea, PND  GI: negative for dysphagia, abdominal pain, nausea, vomiting, diarrhea, constipation, melena, hematochezia, BRBPR  : negative for frequency, urgency, dysuria, hematuria, incontinence  MSK: negative for myalgia, arthralgia, decreased joint ROM, LE swelling  Skin: negative for rash, wounds  Heme/lymph:  negative for easy bruising, bleeding, epistaxis  Neuro: negative for LOC, numbness, tingling, tremor, vertigo, dizziness     Vitals:  There were no vitals filed for this visit.     Physical exam:  Constitutional: Well-developed female in no acute distress.  HEENT: Normocephalic, atraumatic. PERRL. EOMI. No cervical lymphadenopathy.  Respiratory: CTA bilaterally. No wheezes, rales, or rhonchi. Normal respiratory effort.  Cardiovascular: RRR. No murmurs, gallops, or rubs. No JVD. Radial pulses 2+.  Abdominal: Soft, nondistended, nontender to palpation. Bowel sounds present. No hepatosplenomegaly or masses. No CVA tenderness.  Neuro: CN II-XII intact. UE and LE strength 5/5 bilaterally and sensation intact. Normal FTN testing.  MSK: No LE edema bilaterally.  Skin: Warm, dry. No rashes or wounds.  Psych: Appropriate mood and affect.     Labs:  No results found for this or any previous visit (from the past 24 hour(s)).     Imaging:  No results found.     Assessment and plan:  Carol Arevalo is a 54 y.o. female with PMHx of CHF secondary to an ICM (EF 55 to 60% August 2020), morbid obesity, remote history of PE, depressive symptoms, asthma, MIRI, and chronic body pain presenting for routine follow-up.         #GERD  #Atypical Chest pain (improved)  #Trouble swallowing  - Cluster of symptoms most concerning for possible GERD.   - Low concern for cardiac chest pain given lack of typical symptoms and normal stress test in 2020  Plan:   - Increased omperazole to 40BID  -Will followup in 4weeks to check for relief      #HFpEF (55-60%)  #Orthostatic Hypotension  - NYHA class II Stage C HF, EF 55% in 2021  - Lipid panel: Chol 174, HDL-c 52.2, LDL-c: 101, TRIG 105.   Plan:  - c/w carvedilol 25 mg bid (refilled), hydralazine 10 mg BID, isosorbide mononitrate 30 mg daily (refilled), spironolactone 100 mg daily  - c/w furosemide 40mg BID  - c/w Losartan 25 mg daily   - c/w spirolactone 100mg QD  - Patient had aspirin written in her  chart.  She states that she used to be on aspirin, but she has not had aspirin for a year.  Per chart review it appears she has nonischemic cardiomyopathy.  Will not be giving aspirin at this time.  -Cardiology Followup scheduled on oct22     #HTN   - Continue carvedilol, hydralazine, isosorbide mononitrate, losartan as prescribed     #Breast tenderness  #Fibrocystic breast changes?  - Patient with cyclical breast pain related to periods.   - Mammogram Oct 2022: negative   -Mammogram done a year ago: No mammographic evidence of malignancy.  Plan:  - Mammogram ordered     #Thyroid Nodule  #Thyromegaly  #Neck pain  - Had a thyroid nodule noticed on exam at OB/GYN office  - TSH with reflex to T4 within normal limits   Plan:  -Thyroid US and TFTs ordered     #Pelvic pressure  - U/S ordered by OB/GYN, but it was not completed. Patient notified about pelvic ultrasound.      #RUQ pain  - Patient with right upper quadrant pain for the last year  - RUQ US (2022): Cholelithiasis without evidence of acute cholecystitis  - Uses milk and water to sooth the pain  Plan:  - Monitor symptoms; does not appear to be having biliary colic     #Asthma?  #Allergic rhinitis  - No PFTs on file    Plan:  - Nebulizers ordered last visit   - Continue with Flonase 1 spray each nostril daily. Take regularly.   - C/w cetirizine daily as needed. Take regularly.   - Patient counseled on using albuterol inhaler for shortness of breath, wheezing     #Sleep apnea  - Referred to sleep medicine group at the last visit to repeat sleep study.    - STOPBANG: High risk for MIRI  Plan:  - Sleep study done: Moderate MIRI, sleep medicine referral ordered      #Musculoskeletal back pain  #Aches and Pains  - Right latissimus dorsi pain  - Patient has job which requires lifting a lot of objects off ground  - Patient endorsing aches and pains as well as nonspecific symptoms after her COVID-19 infection   Plan:   - Continue tylenol prn. Continue with lidocaine  patches.      #Repetitive behaviors related to COVID-19 infection  - At visit in 2/2024, patient endorsed some repetitive behaviors regarding COVID-19 infection  - Believes her garments were infected with COVID-19 as she felt unwell when wearing them. She has thrown those out.   Plan:  - Follow with psychology/SW as appropriate  - Monitor symptoms      #Health Maintenance  - Immunizations: Influenza (due this season), Tdap (order placed, patient had to leave before getting the shot, will return for a nurse visit), COVID (due), PPV23 (given 2015, due for PCV20). Shingles (due -> patient wanted information regarding the Shingles shot, which was given). Patient states that she has gotten her childhood vaccines.   - Screening:  - Cardiovascular: The 10-year ASCVD risk score (Lindsay SANCHEZ, et al., 2019) is: 4.3%    Values used to calculate the score:      Age: 54 years      Sex: Female      Is Non- : Yes      Diabetic: No      Tobacco smoker: No      Systolic Blood Pressure: 128 mmHg      Is BP treated: Yes      HDL Cholesterol: 52.2 mg/dL      Total Cholesterol: 174 mg/dL   - Diabetes: 5.9%, encourage lifestyle activities  - Cancer: Breast (Due for Mammogram, Cervical (Pap smear due in 2027), Colorectal (colonoscopy reordered), Lung (Not indicated)  - Infectious Disease: ordered HIV, syph and hepatitis screening   - Osteoporosis: Not currently indicated      Follow-up in 4weeks.     Patient and plan discussed with attending physician, Dr. Patrick.     Juan Haines MD  Postgraduate year-II  [facility]

## 2024-08-29 NOTE — PROGRESS NOTES
Chief complaint:    HPI:  Carol Arevalo is a 54 y.o. female with PMHx of CHF secondary to an ICM (EF 55 to 60% August 2020), morbid obesity, remote history of PE, depressive symptoms, asthma, MIRI, and chronic body pain presenting for routine follow-up.     Patient was last seen in [facility] 6/4/24 for GERD and allergic sinusitis.      Medications:    Current Outpatient Medications:     acetaminophen (Tylenol) 325 mg tablet, Take 3 tablets (975 mg) by mouth every 8 hours if needed for mild pain (1 - 3) or moderate pain (4 - 6)., Disp: 30 tablet, Rfl: 3    ALBUTEROL 0.375 MG/ML CONT NEB SYR-SIMPLE, Inhale 4 times a day as needed., Disp: , Rfl:     albuterol 2.5 mg /3 mL (0.083 %) nebulizer solution, Take 3 mL (2.5 mg) by nebulization every 6 hours if needed for wheezing or shortness of breath., Disp: 75 mL, Rfl: 1    albuterol 90 mcg/actuation inhaler, INHALE 1 PUFF BY MOUTH EVERY 4 TO 6 HOURS AS NEEDED FOR COUGH AND SHORTNESS OF BREATH, Disp: 8.5 g, Rfl: 3    carvedilol (Coreg) 25 mg tablet, Take 1 tablet (25 mg) by mouth 2 times a day., Disp: 180 tablet, Rfl: 1    cetirizine (ZyrTEC) 10 mg tablet, Take 1 tablet (10 mg) by mouth once daily as needed for allergies., Disp: 30 tablet, Rfl: 2    fluticasone (Flonase) 50 mcg/actuation nasal spray, Administer 1 spray into each nostril once daily. Shake gently. Before first use, prime pump. After use, clean tip and replace cap., Disp: 16 g, Rfl: 5    furosemide (Lasix) 40 mg tablet, Take 1 tablet (40 mg) by mouth once daily., Disp: 90 tablet, Rfl: 1    hydrALAZINE (Apresoline) 10 mg tablet, Take 1 tablet (10 mg) by mouth 2 times a day., Disp: 180 tablet, Rfl: 1    isosorbide mononitrate ER (Imdur) 30 mg 24 hr tablet, Take 1 tablet (30 mg) by mouth once daily. Do not crush or chew., Disp: 90 tablet, Rfl: 1    losartan (Cozaar) 25 mg tablet, Take 1 tablet (25 mg) by mouth once daily., Disp: 90 tablet, Rfl: 1    nebulizers misc, Please dispense one nebulizer, Disp: 1 each, Rfl:  0    omeprazole (PriLOSEC) 40 mg DR capsule, Take 1 capsule (40 mg) by mouth once daily in the morning. Take before meals. Do not crush or chew., Disp: 30 capsule, Rfl: 3    spironolactone (Aldactone) 100 mg tablet, Take 1 tablet (100 mg) by mouth once daily., Disp: 90 tablet, Rfl: 1    Allergies:  Allergies   Allergen Reactions    Naproxen Dizziness       Review of systems:  Constitutional: negative for fevers, chills, weight loss, weight gain, change in appetite, fatigue, weakness.  HEENT: negative for headache, changes in vision or hearing, congestion, sore throat.  Respiratory: negative for SOB, cough, hemoptysis, wheezing  Cardiovascular: negative for chest pain, palpitations, orthopnea, PND  GI: negative for dysphagia, abdominal pain, nausea, vomiting, diarrhea, constipation, melena, hematochezia, BRBPR  : negative for frequency, urgency, dysuria, hematuria, incontinence  MSK: negative for myalgia, arthralgia, decreased joint ROM, LE swelling  Skin: negative for rash, wounds  Heme/lymph: negative for easy bruising, bleeding, epistaxis  Neuro: negative for LOC, numbness, tingling, tremor, vertigo, dizziness    Vitals:  There were no vitals filed for this visit.    Physical exam:  Constitutional: Well-developed female in no acute distress.  HEENT: Normocephalic, atraumatic. PERRL. EOMI. No cervical lymphadenopathy.  Respiratory: CTA bilaterally. No wheezes, rales, or rhonchi. Normal respiratory effort.  Cardiovascular: RRR. No murmurs, gallops, or rubs. No JVD. Radial pulses 2+.  Abdominal: Soft, nondistended, nontender to palpation. Bowel sounds present. No hepatosplenomegaly or masses. No CVA tenderness.  Neuro: CN II-XII intact. UE and LE strength 5/5 bilaterally and sensation intact. Normal FTN testing.  MSK: No LE edema bilaterally.  Skin: Warm, dry. No rashes or wounds.  Psych: Appropriate mood and affect.    Labs:  No results found for this or any previous visit (from the past 24  hour(s)).    Imaging:  No results found.    Assessment and plan:  Carol Arevalo is a 54 y.o. female with PMHx of CHF secondary to an ICM (EF 55 to 60% August 2020), morbid obesity, remote history of PE, depressive symptoms, asthma, MIRI, and chronic body pain presenting for routine follow-up.     Updates 8/29:       #GERD  #Atypical Chest pain (improved)  #Trouble swallowing  - Cluster of symptoms most concerning for possible GERD.   - Low concern for cardiac chest pain given lack of typical symptoms and normal stress test in 2020  Plan:   - Continue with omeprazole  - Monitor symptoms and pending thyroid ultrasound (see below).      #HFpEF (55-60%)  #Orthostatic Hypotension  - NYHA class II Stage C HF, EF 55% in 2021  - Lipid panel: Chol 174, HDL-c 52.2, LDL-c: 101, TRIG 105.   Plan:  - c/w carvedilol 25 mg bid (refilled), hydralazine 10 mg BID, isosorbide mononitrate 30 mg daily (refilled), spironolactone 100 mg daily  - c/w furosemide 40mg BID  - c/w Losartan 25 mg daily   - Patient had aspirin written in her chart.  She states that she used to be on aspirin, but she has not had aspirin for a year.  Per chart review it appears she has nonischemic cardiomyopathy.  Will not be giving aspirin at this time.  - Needs follow-up with cardiology     #HTN   - Continue carvedilol, hydralazine, isosorbide mononitrate, losartan as prescribed     #Breast tenderness  #Fibrocystic breast changes?  - Patient with cyclical breast pain related to periods.   - Mammogram Oct 2022: negative   Plan:  - Pending mammogram     #Thyroid Nodule  #Thyromegaly  #Neck pain  - Had a thyroid nodule noticed on exam at OB/GYN office  - TSH with reflex to T4 within normal limits   Plan:  - Counseled patient to get thyroid ultrasound  - Pending results, may need further work up if swallowing issues persist     #Pelvic pressure  - U/S ordered by OB/GYN, but it was not completed. Patient notified about pelvic ultrasound.      #RUQ pain  - Patient  with right upper quadrant pain for the last year  - RUQ US (2022): Cholelithiasis without evidence of acute cholecystitis  - Uses milk and water to sooth the pain  Plan:  - Monitor symptoms; does not appear to be having biliary colic     #Asthma?  #Allergic rhinitis  - No PFTs on file  Plan:  - Placed order for new nebulizer during the last visit  - PFTs ordered at last visit  - Continue with Flonase 1 spray each nostril daily. Take regularly.   - C/w cetirizine daily as needed. Take regularly.   - Patient counseled on using albuterol inhaler for shortness of breath, wheezing     #Sleep apnea  - Referred to sleep medicine group at the last visit to repeat sleep study.    - STOPBANG: High risk for MIRI  Plan:  - Sleep study ordered at last visit; will be completed in June      #Musculoskeletal back pain  #Aches and Pains  - Right latissimus dorsi pain  - Patient has job which requires lifting a lot of objects off ground  - Patient endorsing aches and pains as well as nonspecific symptoms after her COVID-19 infection   Plan:   - Continue tylenol prn. Continue with lidocaine patches.      #Repetitive behaviors related to COVID-19 infection  - At visit in 2/2024, patient endorsed some repetitive behaviors regarding COVID-19 infection  - Believes her garments were infected with COVID-19 as she felt unwell when wearing them. She has thrown those out.   Plan:  - Follow with psychology/SW as appropriate  - Monitor symptoms      #Health Maintenance  - Immunizations: Influenza (due this season), Tdap (order placed, patient had to leave before getting the shot, will return for a nurse visit), COVID (due), PPV23 (given 2015, due for PCV20). Shingles (due -> patient wanted information regarding the Shingles shot, which was given). Patient states that she has gotten her childhood vaccines.   - Screening:  - Cardiovascular: The 10-year ASCVD risk score (Lindsay SANCHEZ, et al., 2019) is: 4.3%    Values used to calculate the score:       Age: 54 years      Sex: Female      Is Non- : Yes      Diabetic: No      Tobacco smoker: No      Systolic Blood Pressure: 128 mmHg      Is BP treated: Yes      HDL Cholesterol: 52.2 mg/dL      Total Cholesterol: 174 mg/dL   - Diabetes: 5.9%, encourage lifestyle activities  - Cancer: Breast (Due for Mammogram, Cervical (Pap smear due in ), Colorectal (colonoscopy scheduled for 2024), Lung (Not indicated)  - Infectious Disease: Screened for HIV and Hep in   - Osteoporosis: Not currently indicated      # Health maintenance  Last HbA1c:  Infectious: Hep C, HIV ***  Vaccinations: Tdap ***, flu ***, VZV ***  Colonoscopy:  {Gendered CA screenin}  Low-dose CT chest:  Bone density:  AAA screening:    Follow-up in ***.    Patient and plan discussed with attending physician, {[facility] attendings:[id number]}.    Osvaldo Kidd MD  PGY-1 Internal Medicine  [facility]

## 2024-09-05 DIAGNOSIS — K21.9 GASTRO-ESOPHAGEAL REFLUX DISEASE WITHOUT ESOPHAGITIS: ICD-10-CM

## 2024-09-05 RX ORDER — OMEPRAZOLE 40 MG/1
40 CAPSULE, DELAYED RELEASE ORAL
Qty: 30 CAPSULE | Refills: 3 | Status: SHIPPED | OUTPATIENT
Start: 2024-09-05

## 2024-09-07 DIAGNOSIS — I50.32 CHRONIC DIASTOLIC CONGESTIVE HEART FAILURE (MULTI): ICD-10-CM

## 2024-09-09 RX ORDER — FUROSEMIDE 40 MG/1
TABLET ORAL
Qty: 220 TABLET | Refills: 3 | Status: SHIPPED | OUTPATIENT
Start: 2024-09-09

## 2024-10-22 ENCOUNTER — OFFICE VISIT (OUTPATIENT)
Dept: CARDIOLOGY | Facility: HOSPITAL | Age: 54
End: 2024-10-22
Payer: COMMERCIAL

## 2024-10-22 VITALS
WEIGHT: 256 LBS | HEIGHT: 65 IN | HEART RATE: 76 BPM | DIASTOLIC BLOOD PRESSURE: 78 MMHG | SYSTOLIC BLOOD PRESSURE: 121 MMHG | BODY MASS INDEX: 42.65 KG/M2 | OXYGEN SATURATION: 97 %

## 2024-10-22 DIAGNOSIS — I50.32 CHRONIC DIASTOLIC CONGESTIVE HEART FAILURE: ICD-10-CM

## 2024-10-22 DIAGNOSIS — I50.22 CHRONIC SYSTOLIC HEART FAILURE: Primary | ICD-10-CM

## 2024-10-22 PROCEDURE — 99214 OFFICE O/P EST MOD 30 MIN: CPT | Performed by: INTERNAL MEDICINE

## 2024-10-22 PROCEDURE — 93010 ELECTROCARDIOGRAM REPORT: CPT | Performed by: INTERNAL MEDICINE

## 2024-10-22 PROCEDURE — 93005 ELECTROCARDIOGRAM TRACING: CPT | Performed by: INTERNAL MEDICINE

## 2024-10-22 PROCEDURE — 3008F BODY MASS INDEX DOCD: CPT | Performed by: INTERNAL MEDICINE

## 2024-10-22 RX ORDER — SPIRONOLACTONE 100 MG/1
100 TABLET, FILM COATED ORAL DAILY
Qty: 90 TABLET | Refills: 3 | Status: SHIPPED | OUTPATIENT
Start: 2024-10-22 | End: 2025-10-22

## 2024-10-22 RX ORDER — CARVEDILOL 25 MG/1
25 TABLET ORAL 2 TIMES DAILY
Qty: 180 TABLET | Refills: 1 | Status: SHIPPED | OUTPATIENT
Start: 2024-10-22 | End: 2025-04-20

## 2024-10-22 RX ORDER — HYDRALAZINE HYDROCHLORIDE 10 MG/1
10 TABLET, FILM COATED ORAL 2 TIMES DAILY
Qty: 180 TABLET | Refills: 3 | Status: SHIPPED | OUTPATIENT
Start: 2024-10-22 | End: 2025-10-22

## 2024-10-22 RX ORDER — ISOSORBIDE MONONITRATE 30 MG/1
30 TABLET, EXTENDED RELEASE ORAL DAILY
Qty: 90 TABLET | Refills: 3 | Status: SHIPPED | OUTPATIENT
Start: 2024-10-22 | End: 2025-10-22

## 2024-10-22 RX ORDER — FUROSEMIDE 40 MG/1
TABLET ORAL
Qty: 220 TABLET | Refills: 3 | Status: SHIPPED | OUTPATIENT
Start: 2024-10-22

## 2024-10-22 RX ORDER — LOSARTAN POTASSIUM 25 MG/1
25 TABLET ORAL DAILY
Qty: 90 TABLET | Refills: 3 | Status: SHIPPED | OUTPATIENT
Start: 2024-10-22 | End: 2025-10-22

## 2024-10-22 NOTE — PROGRESS NOTES
Chief Complaint:   Follow up visit      History Of Present Illness:    Carol Arevalo is a 54 y.o. female presenting for routine follow up  She has a PMH significant for  systolic HF/NICM/HF with recovery (LVEF 55-60%), obesity, and MIRI.  Was being closely followed by Lidia Ayala.    Interval Hx  Currently denies chest pain, palpitations, shortness of breath, dyspnea on exertion, orthopnea, PND. Edema noted in BLE. Patient denies headaches, dizziness or recent falls.     Hospitalizations: Denies      Last Recorded Vitals:  Vitals:    10/22/24 1148   BP: 121/78   Pulse: 76   SpO2: 97%         Past Medical History:  She has a past medical history of Acute sinusitis, unspecified (11/10/2016), Acute streptococcal tonsillitis, unspecified (04/13/2017), Cardiomyopathy, unspecified (Multi) (09/14/2021), Cervicalgia (05/26/2017), Encounter for gynecological examination (general) (routine) without abnormal findings (05/31/2016), Encounter for gynecological examination (general) (routine) without abnormal findings (06/10/2016), Encounter for other general counseling and advice on contraception (05/31/2016), Encounter for other screening for malignant neoplasm of breast (11/27/2017), Encounter for screening for infections with a predominantly sexual mode of transmission (05/31/2016), Encounter for screening for infections with a predominantly sexual mode of transmission (03/24/2015), Encounter for screening for infections with a predominantly sexual mode of transmission, Encounter for screening for malignant neoplasm of cervix (01/14/2022), Essential (primary) hypertension, Morbid (severe) obesity due to excess calories (Multi) (03/20/2018), Other chest pain (05/04/2017), Other conditions influencing health status, Other disorders of electrolyte and fluid balance, not elsewhere classified (10/15/2016), Other problems related to lifestyle (05/31/2016), Other problems related to lifestyle, Other pulmonary embolism without  acute cor pulmonale (Multi) (12/18/2020), Personal history of other benign neoplasm (08/30/2016), Personal history of other diseases of the circulatory system, Personal history of other diseases of the circulatory system (08/27/2013), Personal history of other diseases of the digestive system (05/23/2017), Personal history of other diseases of the musculoskeletal system and connective tissue (10/13/2017), Personal history of other diseases of the respiratory system, Personal history of other diseases of the respiratory system (11/10/2016), Personal history of other specified conditions (10/15/2016), Personal history of other specified conditions (09/03/2015), Polyp of cervix uteri (02/11/2022), and Unspecified asthma, uncomplicated ([facility]) (12/22/2021).    Past Surgical History:  She has a past surgical history that includes Other surgical history (03/24/2015).      Social History:  She reports that she has never smoked. She has never used smokeless tobacco. She reports current alcohol use. She reports that she does not currently use drugs.    Family History:  No family history on file.     Allergies:  Naproxen    Outpatient Medications:  Current Outpatient Medications   Medication Instructions    acetaminophen (TYLENOL) 975 mg, oral, Every 8 hours PRN    albuterol (ProAir HFA) 90 mcg/actuation inhaler 2 puffs, inhalation, Every 4 hours PRN    ALBUTEROL 0.375 MG/ML CONT NEB SYR-SIMPLE 4 times daily PRN    albuterol 90 mcg/actuation inhaler INHALE 1 PUFF BY MOUTH EVERY 4 TO 6 HOURS AS NEEDED FOR COUGH AND SHORTNESS OF BREATH    albuterol 2.5 mg, nebulization, Every 6 hours PRN    carvedilol (COREG) 25 mg, oral, 2 times daily    cetirizine (ZYRTEC) 10 mg, oral, Daily PRN    fluticasone (Flonase) 50 mcg/actuation nasal spray 1 spray, Each Nostril, Daily, Shake gently. Before first use, prime pump. After use, clean tip and replace cap.    furosemide (Lasix) 40 mg tablet TAKE 1 TABLET TWICE DAILY AND AN ADDITIONAL  TABLET AS NEEDED FOR SWELLING OR WEIGHT GAIN    hydrALAZINE (APRESOLINE) 10 mg, oral, 2 times daily    isosorbide mononitrate ER (IMDUR) 30 mg, oral, Daily, Do not crush or chew.    losartan (COZAAR) 25 mg, oral, Daily    nebulizers misc Please dispense one nebulizer    omeprazole (PRILOSEC) 40 mg, oral, Daily before breakfast, Do not crush or chew    spironolactone (ALDACTONE) 100 mg, oral, Daily        Physical Exam:  GEN: NAD , AOX3  HEENT : JVP at the clavicle   Heart : regular rhythm , normal S1 and S2 , no murmurs   Lungs : clear , resonant , normal air entry bilaterally   Abdomen : soft , non tender   Ext: warm , well perfused , no edema   Neuro : grossly intact       Last Labs:  CBC -  Lab Results   Component Value Date    WBC 6.1 06/24/2022    HGB 12.1 06/24/2022    HCT 39.3 06/24/2022    MCV 86 06/24/2022     06/24/2022       CMP -  Lab Results   Component Value Date    CALCIUM 9.5 04/02/2024    PHOS 4.0 04/02/2024    PROT 8.6 (H) 06/24/2022    ALBUMIN 3.9 04/02/2024    AST 15 06/24/2022    ALT 8 06/24/2022    ALKPHOS 55 06/24/2022    BILITOT 0.5 06/24/2022       LIPID PANEL -   Lab Results   Component Value Date    CHOL 174 02/21/2024    TRIG 105 02/21/2024    HDL 52.2 02/21/2024    CHHDL 3.3 02/21/2024    LDLF 67 05/12/2023    VLDL 21 02/21/2024    NHDL 122 02/21/2024       RENAL FUNCTION PANEL -   Lab Results   Component Value Date    GLUCOSE 76 04/02/2024     04/02/2024    K 5.0 04/02/2024     04/02/2024    CO2 26 04/02/2024    ANIONGAP 15 04/02/2024    BUN 13 04/02/2024    CREATININE 0.82 04/02/2024    CALCIUM 9.5 04/02/2024    PHOS 4.0 04/02/2024    ALBUMIN 3.9 04/02/2024        Lab Results   Component Value Date    BNP 34 05/12/2023    HGBA1C 5.9 (H) 02/21/2024       Last Cardiology Tests:  ECG:  Normal sinus rhythm     Echo:( 08/2021)    1. The left ventricular systolic function is normal with a 55% estimated ejection fraction.     Stress test ( 2020)    1. No  electrocardiographic evidence for ischemia at a maximal workload.   2. The adequate level of stress was achieved.      Assessment/Plan   Carol Arevalo is a 54 y.o. female presenting for routine follow up  She has a PMH significant for  systolic HF/NICM/HF with recovery (LVEF 55-60%), obesity, and MIRI.    -Continue current medical regimen with carvedilol 25 mg BID , hydralazine 10 mg BID , losartan 25 mg every day , imdur 30 mg and spironolactone 100 mg daily -will have to consolidate it at the next visit , depending on how her repeat echocardiogram is  -Obtain routine labs   - Follow up in 6 months

## 2024-10-22 NOTE — PATIENT INSTRUCTIONS
To reach Dr. Jensen's office please call [phone] ([place]). Fax [phone]. Call [phone] to schedule an appointment. You may also contact the HF RNs at [email or web address]    Thank you for coming to your appointment today. If you have any questions or need cardiac medication refills, please call the Heart Failure Office at [phone] option [phone].     Please schedule an echo. Call [phone]  Please have labs drawn  Follow up with Dr. Jensen in 6 months

## 2024-10-24 LAB
ATRIAL RATE: 70 BPM
P AXIS: 31 DEGREES
P OFFSET: 194 MS
P ONSET: 135 MS
PR INTERVAL: 170 MS
Q ONSET: 220 MS
QRS COUNT: 12 BEATS
QRS DURATION: 76 MS
QT INTERVAL: 396 MS
QTC CALCULATION(BAZETT): 427 MS
QTC FREDERICIA: 417 MS
R AXIS: 48 DEGREES
T AXIS: 52 DEGREES
T OFFSET: 418 MS
VENTRICULAR RATE: 70 BPM

## 2024-11-01 ENCOUNTER — LAB (OUTPATIENT)
Dept: LAB | Facility: LAB | Age: 54
End: 2024-11-01
Payer: COMMERCIAL

## 2024-11-01 ENCOUNTER — HOSPITAL ENCOUNTER (OUTPATIENT)
Dept: RADIOLOGY | Facility: HOSPITAL | Age: 54
Discharge: HOME | End: 2024-11-01
Payer: COMMERCIAL

## 2024-11-01 DIAGNOSIS — E04.1 THYROID NODULE: ICD-10-CM

## 2024-11-01 DIAGNOSIS — R73.03 PREDIABETES: ICD-10-CM

## 2024-11-01 DIAGNOSIS — Z13.9 SCREENING DUE: ICD-10-CM

## 2024-11-01 DIAGNOSIS — I50.9 HEART FAILURE, UNSPECIFIED HF CHRONICITY, UNSPECIFIED HEART FAILURE TYPE: ICD-10-CM

## 2024-11-01 DIAGNOSIS — D50.0 IRON DEFICIENCY ANEMIA DUE TO CHRONIC BLOOD LOSS: ICD-10-CM

## 2024-11-01 DIAGNOSIS — I10 PRIMARY HYPERTENSION: ICD-10-CM

## 2024-11-01 DIAGNOSIS — I50.22 CHRONIC SYSTOLIC HEART FAILURE: ICD-10-CM

## 2024-11-01 DIAGNOSIS — R13.10 DYSPHAGIA, UNSPECIFIED TYPE: ICD-10-CM

## 2024-11-01 LAB
ALBUMIN SERPL BCP-MCNC: 4 G/DL (ref 3.4–5)
ANION GAP SERPL CALC-SCNC: 11 MMOL/L (ref 10–20)
BASOPHILS # BLD AUTO: 0.06 X10*3/UL (ref 0–0.1)
BASOPHILS NFR BLD AUTO: 0.8 %
BNP SERPL-MCNC: 126 PG/ML (ref 0–99)
BUN SERPL-MCNC: 14 MG/DL (ref 6–23)
CALCIUM SERPL-MCNC: 8.8 MG/DL (ref 8.6–10.6)
CHLORIDE SERPL-SCNC: 104 MMOL/L (ref 98–107)
CO2 SERPL-SCNC: 29 MMOL/L (ref 21–32)
CREAT SERPL-MCNC: 0.93 MG/DL (ref 0.5–1.05)
EGFRCR SERPLBLD CKD-EPI 2021: 73 ML/MIN/1.73M*2
EOSINOPHIL # BLD AUTO: 0.26 X10*3/UL (ref 0–0.7)
EOSINOPHIL NFR BLD AUTO: 3.3 %
ERYTHROCYTE [DISTWIDTH] IN BLOOD BY AUTOMATED COUNT: 14.6 % (ref 11.5–14.5)
EST. AVERAGE GLUCOSE BLD GHB EST-MCNC: 128 MG/DL
GLUCOSE SERPL-MCNC: 81 MG/DL (ref 74–99)
HBA1C MFR BLD: 6.1 %
HCT VFR BLD AUTO: 37.2 % (ref 36–46)
HCV AB SER QL: NONREACTIVE
HGB BLD-MCNC: 11.5 G/DL (ref 12–16)
HIV 1+2 AB+HIV1 P24 AG SERPL QL IA: NONREACTIVE
IMM GRANULOCYTES # BLD AUTO: 0.03 X10*3/UL (ref 0–0.7)
IMM GRANULOCYTES NFR BLD AUTO: 0.4 % (ref 0–0.9)
LYMPHOCYTES # BLD AUTO: 3.2 X10*3/UL (ref 1.2–4.8)
LYMPHOCYTES NFR BLD AUTO: 40 %
MAGNESIUM SERPL-MCNC: 2.13 MG/DL (ref 1.6–2.4)
MCH RBC QN AUTO: 28.9 PG (ref 26–34)
MCHC RBC AUTO-ENTMCNC: 30.9 G/DL (ref 32–36)
MCV RBC AUTO: 94 FL (ref 80–100)
MONOCYTES # BLD AUTO: 0.61 X10*3/UL (ref 0.1–1)
MONOCYTES NFR BLD AUTO: 7.6 %
NEUTROPHILS # BLD AUTO: 3.84 X10*3/UL (ref 1.2–7.7)
NEUTROPHILS NFR BLD AUTO: 47.9 %
NRBC BLD-RTO: 0 /100 WBCS (ref 0–0)
PHOSPHATE SERPL-MCNC: 3.7 MG/DL (ref 2.5–4.9)
PLATELET # BLD AUTO: 263 X10*3/UL (ref 150–450)
POTASSIUM SERPL-SCNC: 4.3 MMOL/L (ref 3.5–5.3)
RBC # BLD AUTO: 3.98 X10*6/UL (ref 4–5.2)
SODIUM SERPL-SCNC: 140 MMOL/L (ref 136–145)
TREPONEMA PALLIDUM IGG+IGM AB [PRESENCE] IN SERUM OR PLASMA BY IMMUNOASSAY: NONREACTIVE
TSH SERPL-ACNC: 0.85 MIU/L (ref 0.44–3.98)
WBC # BLD AUTO: 8 X10*3/UL (ref 4.4–11.3)

## 2024-11-01 PROCEDURE — 86780 TREPONEMA PALLIDUM: CPT

## 2024-11-01 PROCEDURE — 83880 ASSAY OF NATRIURETIC PEPTIDE: CPT

## 2024-11-01 PROCEDURE — 84443 ASSAY THYROID STIM HORMONE: CPT

## 2024-11-01 PROCEDURE — 76536 US EXAM OF HEAD AND NECK: CPT

## 2024-11-01 PROCEDURE — 83540 ASSAY OF IRON: CPT

## 2024-11-01 PROCEDURE — 82728 ASSAY OF FERRITIN: CPT

## 2024-11-01 PROCEDURE — 87389 HIV-1 AG W/HIV-1&-2 AB AG IA: CPT

## 2024-11-01 PROCEDURE — 83735 ASSAY OF MAGNESIUM: CPT

## 2024-11-01 PROCEDURE — 83036 HEMOGLOBIN GLYCOSYLATED A1C: CPT

## 2024-11-01 PROCEDURE — 83550 IRON BINDING TEST: CPT

## 2024-11-01 PROCEDURE — 76856 US EXAM PELVIC COMPLETE: CPT

## 2024-11-01 PROCEDURE — 80069 RENAL FUNCTION PANEL: CPT

## 2024-11-01 PROCEDURE — 85025 COMPLETE CBC W/AUTO DIFF WBC: CPT

## 2024-11-01 PROCEDURE — 86803 HEPATITIS C AB TEST: CPT

## 2024-11-01 PROCEDURE — 36415 COLL VENOUS BLD VENIPUNCTURE: CPT

## 2024-11-03 DIAGNOSIS — D50.0 IRON DEFICIENCY ANEMIA DUE TO CHRONIC BLOOD LOSS: Primary | ICD-10-CM

## 2024-11-03 LAB
FERRITIN SERPL-MCNC: 39 NG/ML (ref 8–150)
IRON SATN MFR SERPL: 19 % (ref 25–45)
IRON SERPL-MCNC: 68 UG/DL (ref 35–150)
TIBC SERPL-MCNC: 362 UG/DL (ref 240–445)
UIBC SERPL-MCNC: 294 UG/DL (ref 110–370)

## 2024-11-08 ENCOUNTER — TELEPHONE (OUTPATIENT)
Dept: PRIMARY CARE | Facility: HOSPITAL | Age: 54
End: 2024-11-08
Payer: COMMERCIAL

## 2024-11-08 DIAGNOSIS — E61.1 IRON DEFICIENCY: ICD-10-CM

## 2024-11-08 DIAGNOSIS — J45.909 ASTHMA, UNSPECIFIED ASTHMA SEVERITY, UNSPECIFIED WHETHER COMPLICATED, UNSPECIFIED WHETHER PERSISTENT (HHS-HCC): ICD-10-CM

## 2024-11-08 RX ORDER — FLUTICASONE PROPIONATE 50 MCG
1 SPRAY, SUSPENSION (ML) NASAL DAILY
Qty: 16 ML | Refills: 5 | Status: SHIPPED | OUTPATIENT
Start: 2024-11-08 | End: 2025-11-08

## 2024-11-08 RX ORDER — FERROUS SULFATE 325(65) MG
325 TABLET ORAL
Qty: 30 TABLET | Refills: 11 | Status: SHIPPED | OUTPATIENT
Start: 2024-11-08 | End: 2025-11-08

## 2024-11-08 NOTE — TELEPHONE ENCOUNTER
----- Message from Kim Jennings sent at 11/7/2024 11:02 PM EST -----  Regarding: Lab results  Dr. Guo,    Would you be able to let the patient know about her iron studies? Her ferritin is normal, but her total saturation is low. She could benefit from iron supplementation every other day. Additionally, she needs to get a colonoscopy done. Could you please let her know that her order is active and provide her with the scheduling number if she doesn't have it?    Thank you.  ----- Message -----  From: Lab, Background User  Sent: 11/1/2024   7:52 PM EST  To: Kim Jennings MD  
Called patient, gave her number for colonoscopy scheduling. Sent iron prescription.  
Smoking Cessation

## 2024-11-12 ENCOUNTER — HOSPITAL ENCOUNTER (OUTPATIENT)
Dept: CARDIOLOGY | Facility: HOSPITAL | Age: 54
Discharge: HOME | End: 2024-11-12
Payer: COMMERCIAL

## 2024-11-12 DIAGNOSIS — I50.22 CHRONIC SYSTOLIC HEART FAILURE: ICD-10-CM

## 2024-11-12 PROCEDURE — 93306 TTE W/DOPPLER COMPLETE: CPT | Performed by: INTERNAL MEDICINE

## 2024-11-12 PROCEDURE — 93306 TTE W/DOPPLER COMPLETE: CPT

## 2024-11-13 LAB
AORTIC VALVE PEAK VELOCITY: 1.9 M/S
AV PEAK GRADIENT: 15 MMHG
AVA (PEAK VEL): 1.86 CM2
EJECTION FRACTION APICAL 4 CHAMBER: 55.9
EJECTION FRACTION: 62 %
LEFT ATRIUM VOLUME AREA LENGTH INDEX BSA: 33.8 ML/M2
LEFT VENTRICLE INTERNAL DIMENSION DIASTOLE: 5.05 CM (ref 3.5–6)
LEFT VENTRICULAR OUTFLOW TRACT DIAMETER: 2.17 CM
MITRAL VALVE E/A RATIO: 1.02
RIGHT VENTRICLE FREE WALL PEAK S': 17 CM/S
RIGHT VENTRICLE PEAK SYSTOLIC PRESSURE: 29.1 MMHG
TRICUSPID ANNULAR PLANE SYSTOLIC EXCURSION: 2.7 CM

## 2024-11-15 DIAGNOSIS — D21.9 LEIOMYOMA: Primary | ICD-10-CM

## 2024-11-15 NOTE — PROGRESS NOTES
"Called the patient to tell her about her uterine ultrasound results showing:  \"Numerous predominantly hypoechoic heterogenous myometrial lesions within the uterus are most consistent with leiomyomas, with the largest measuring up to 5.5 X 4.8 X 6.2 cm\" and that she will need to be evaluated by Obstetrics and Gynecology. I placed the referral for ObGyn. Patient agree with plan.     Diego Schmidt MD  Internal Medicine, PGY-3  "

## 2024-11-20 ENCOUNTER — OFFICE VISIT (OUTPATIENT)
Dept: PRIMARY CARE | Facility: HOSPITAL | Age: 54
End: 2024-11-20
Payer: COMMERCIAL

## 2024-11-20 VITALS
WEIGHT: 259.4 LBS | TEMPERATURE: 97.9 F | HEART RATE: 85 BPM | BODY MASS INDEX: 43.22 KG/M2 | OXYGEN SATURATION: 96 % | HEIGHT: 65 IN | SYSTOLIC BLOOD PRESSURE: 105 MMHG | DIASTOLIC BLOOD PRESSURE: 72 MMHG

## 2024-11-20 DIAGNOSIS — G47.33 OSA (OBSTRUCTIVE SLEEP APNEA): ICD-10-CM

## 2024-11-20 DIAGNOSIS — J45.909 ASTHMA, UNSPECIFIED ASTHMA SEVERITY, UNSPECIFIED WHETHER COMPLICATED, UNSPECIFIED WHETHER PERSISTENT (HHS-HCC): ICD-10-CM

## 2024-11-20 DIAGNOSIS — Z23 NEED FOR TDAP VACCINATION: ICD-10-CM

## 2024-11-20 DIAGNOSIS — G89.29 CHRONIC BILATERAL LOW BACK PAIN WITHOUT SCIATICA: Primary | ICD-10-CM

## 2024-11-20 DIAGNOSIS — M54.50 CHRONIC BILATERAL LOW BACK PAIN WITHOUT SCIATICA: Primary | ICD-10-CM

## 2024-11-20 RX ORDER — ALBUTEROL SULFATE 90 UG/1
2 INHALANT RESPIRATORY (INHALATION) EVERY 4 HOURS PRN
Qty: 16 G | Refills: 11 | Status: SHIPPED | OUTPATIENT
Start: 2024-11-20 | End: 2025-11-20

## 2024-11-20 ASSESSMENT — ENCOUNTER SYMPTOMS
OCCASIONAL FEELINGS OF UNSTEADINESS: 0
LOSS OF SENSATION IN FEET: 0
DEPRESSION: 0

## 2024-11-20 ASSESSMENT — PAIN SCALES - GENERAL: PAINLEVEL_OUTOF10: 2

## 2024-11-20 NOTE — PATIENT INSTRUCTIONS
As discussed today, our plan is:     Labs: no new labs today. You are prediabetic, we talked about exercise and diet changes and the importance of losing weight.   Medication changes: no medication changes  Consultations - you were referred to see the following specialists: sleep medicine, physical therapy. Also, make sure to call and schedule your pulmonary function test, your colonoscopy, and your mammogram. You should receive a call from central scheduling in the next few days if you do not receive a call within 3-5 business days please call [phone] to schedule your appointment.   4.   If you smoke or use other tobacco products, take steps to quit. Call [phone] for more information or to set up an appointment with  [facility]. The [place] [employer] Quit Line is a free resource for people who don’t have insurance, receive Medicaid, pregnant women, or members of the [place] [facility]. Call 6-358-QUIT-NOW or 1-147.526.1099.    Please come back to see us in: 6 months   ------  If you have any problems or questions, please contact the clinic at [phone] to leave a message. Our fax number is [phone]. If your issue cannot wait until the next business day, please go to urgent care or the emergency department.     I also strongly urge all of my patients to register for [facility] by going to: [email or web address]  (The  staff can also send you a text/email link to register when you check out).    No shows: It is understandable if you are unable to make it to a visit, but please cancel your appointment instead of not showing up. This helps to give other patients access to primary care and keeps wait times low.        [facility]   [phone]

## 2024-11-20 NOTE — PROGRESS NOTES
Chief complaint: Routine follow-up    HPI:  Carol Arevalo is a 54 y.o. female w/ PMHx NICM/sys HFrecEF (EF 62% 11/2024), morbid obesity, remote history of PE, depressive symptoms, asthma, MIRI, and chronic body pain who presents for 5 month follow-up.     Overall, patient has no acute complaints and is here for routine follow-up and to go over labs and imaging results.  Patient recently had a thyroid ultrasound which is unremarkable and a pelvic ultrasound which showed numerous large nodules representing leiomyomas.  On questioning, patient denies any abnormal uterine bleeding.  She does endorse irregular menses where she might have multiple months of amenorrhea followed by a 7-day long period of normal amount of bleeding.  She denies any significant symptoms of menopause though she has been experiencing some hot flashes this week.  She also recently had a sleep study which showed moderate MIRI though she has not gotten her CPAP machine yet.  She would like to follow-up with sleep medicine to obtain this machine.     Patient's labs were largely unremarkable other than a prediabetic HbA1c of 6.1%.  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 She does not exercise or walk outside of work      Medications:  Current Outpatient Medications   Medication Instructions    acetaminophen (TYLENOL) 975 mg, oral, Every 8 hours PRN    albuterol (ProAir HFA) 90 mcg/actuation inhaler 2 puffs, inhalation, Every 4 hours PRN    albuterol 2.5 mg, nebulization, Every 6 hours PRN    carvedilol (COREG) 25 mg, oral, 2 times daily    cetirizine (ZYRTEC) 10 mg, oral, Daily  PRN    ferrous sulfate (325 mg ferrous sulfate) 325 mg, oral, Daily with breakfast    fluticasone (Flonase) 50 mcg/actuation nasal spray 1 spray, Each Nostril, Daily, Shake gently. Before first use, prime pump. After use, clean tip and replace cap.    furosemide (Lasix) 40 mg tablet TAKE 1 TABLET TWICE DAILY AND AN ADDITIONAL TABLET AS NEEDED FOR SWELLING OR WEIGHT GAIN    hydrALAZINE (APRESOLINE) 10 mg, oral, 2 times daily    isosorbide mononitrate ER (IMDUR) 30 mg, oral, Daily, Do not crush or chew.    losartan (COZAAR) 25 mg, oral, Daily    nebulizers misc Please dispense one nebulizer    omeprazole (PRILOSEC) 40 mg, oral, Daily before breakfast, Do not crush or chew    spironolactone (ALDACTONE) 100 mg, oral, Daily       Allergies:  Allergies   Allergen Reactions    Naproxen Dizziness       Past medical history:  Past Medical History:   Diagnosis Date    Acute sinusitis, unspecified 11/10/2016    Subacute sinusitis, unspecified location    Acute streptococcal tonsillitis, unspecified 04/13/2017    Strep tonsillitis    Cardiomyopathy, unspecified 09/14/2021    Cardiomyopathy    Cervicalgia 05/26/2017    Spine pain, cervical    Encounter for gynecological examination (general) (routine) without abnormal findings 05/31/2016    Encounter for annual routine gynecological examination    Encounter for gynecological examination (general) (routine) without abnormal findings 06/10/2016    Well woman exam with routine gynecological exam    Encounter for other general counseling and advice on contraception 05/31/2016    Counseling for birth control, intrauterine device    Encounter for other screening for malignant neoplasm of breast 11/27/2017    Screening for breast cancer    Encounter for screening for infections with a predominantly sexual mode of transmission 05/31/2016    Routine screening for STI (sexually transmitted infection)    Encounter for screening for infections with a predominantly sexual mode of  transmission 03/24/2015    Routine screening for STI (sexually transmitted infection)    Encounter for screening for infections with a predominantly sexual mode of transmission     Screening for STDs (sexually transmitted diseases)    Encounter for screening for malignant neoplasm of cervix 01/14/2022    Cervical cancer screening    Essential (primary) hypertension     Chronic hypertension    Morbid (severe) obesity due to excess calories (Multi) 03/20/2018    Obesity, morbid (more than 100 lbs over ideal weight or BMI > 40)    Other chest pain 05/04/2017    Atypical chest pain    Other conditions influencing health status     History of vaginal delivery    Other disorders of electrolyte and fluid balance, not elsewhere classified 10/15/2016    Electrolyte and fluid disorder    Other problems related to lifestyle 05/31/2016    Other problems related to lifestyle    Other problems related to lifestyle     Other problems related to lifestyle    Other pulmonary embolism without acute cor pulmonale 12/18/2020    Pulmonary embolism    Personal history of other benign neoplasm 08/30/2016    History of uterine leiomyoma    Personal history of other diseases of the circulatory system     History of congestive heart failure    Personal history of other diseases of the circulatory system 08/27/2013    History of sinus tachycardia    Personal history of other diseases of the digestive system 05/23/2017    History of esophageal reflux    Personal history of other diseases of the musculoskeletal system and connective tissue 10/13/2017    History of muscle pain    Personal history of other diseases of the respiratory system     History of sore throat    Personal history of other diseases of the respiratory system 11/10/2016    History of sinusitis    Personal history of other specified conditions 10/15/2016    History of dizziness    Personal history of other specified conditions 09/03/2015    History of bradycardia    Polyp of  cervix uteri 02/11/2022    Cervical polyp    Unspecified asthma, uncomplicated ([facility]) 12/22/2021    Asthma       Surgical history:  Past Surgical History:   Procedure Laterality Date    OTHER SURGICAL HISTORY  03/24/2015    Biopsy Vulvar       Family history:  No family history on file.    Social history:   reports that she has never smoked. She has never used smokeless tobacco. She reports current alcohol use. She reports that she does not currently use drugs.    Health maintenance:  Health Maintenance   Topic Date Due    Yearly Adult Physical  Never done    MMR Vaccines (1 of 1 - Standard series) Never done    Hepatitis B Vaccines (1 of 3 - 19+ 3-dose series) Never done    Pneumococcal Vaccine: Pediatrics (0 to 5 Years) and At-Risk Patients (6 to 64 Years) (2 of 2 - PCV) 11/10/2016    Zoster Vaccines (1 of 2) Never done    Mammogram  10/25/2023    Influenza Vaccine (1) 09/01/2024    COVID-19 Vaccine (1 - 2024-25 season) Never done    Cervical Cancer Screening  01/14/2025    Creatinine Level  11/01/2025    Potassium Level  11/01/2025    Diabetes: Hemoglobin A1C  11/01/2025    Diabetes Screening  11/01/2025    Echocardiogram  11/12/2025    Lipid Panel  02/21/2029    Colorectal Cancer Screening  12/16/2030    DTaP/Tdap/Td Vaccines (2 - Td or Tdap) 11/20/2034    HIV Screening  Completed    Hepatitis C Screening  Completed    HIB Vaccines  Aged Out    IPV Vaccines  Aged Out    Hepatitis A Vaccines  Aged Out    Meningococcal Vaccine  Aged Out    Rotavirus Vaccines  Aged Out    HPV Vaccines  Aged Out    Irritable Bowel Syndrome  Discontinued       Review of systems:  Negative other than noted above    Vitals:  Vitals:    11/20/24 1521   BP: 105/72   Pulse: 85   Temp: 36.6 °C (97.9 °F)   SpO2: 96%       Physical exam:  Physical Exam  Constitutional:       General: She is not in acute distress.     Appearance: She is obese.   Eyes:      Extraocular Movements: Extraocular movements intact.   Cardiovascular:      Rate and  "Rhythm: Normal rate and regular rhythm.      Heart sounds: Normal heart sounds. No murmur heard.  Pulmonary:      Effort: Pulmonary effort is normal. No respiratory distress.      Breath sounds: Normal breath sounds. No wheezing.   Abdominal:      General: Abdomen is flat. Bowel sounds are normal.      Palpations: Abdomen is soft.      Tenderness: There is no abdominal tenderness. There is no guarding or rebound.   Musculoskeletal:      Cervical back: Normal range of motion.      Right lower leg: Edema present.      Left lower leg: Edema present.      Comments: Bilateral LE non-pitting edema   Skin:     General: Skin is warm.   Neurological:      General: No focal deficit present.      Mental Status: She is alert.   Psychiatric:         Mood and Affect: Mood normal.         Behavior: Behavior normal.         Thought Content: Thought content normal.         Judgment: Judgment normal.       Labs:  Lab Results   Component Value Date    WBC 8.0 11/01/2024    HGB 11.5 (L) 11/01/2024    HCT 37.2 11/01/2024    MCV 94 11/01/2024     11/01/2024       Lab Results   Component Value Date    GLUCOSE 81 11/01/2024    CALCIUM 8.8 11/01/2024     11/01/2024    K 4.3 11/01/2024    CO2 29 11/01/2024     11/01/2024    BUN 14 11/01/2024    CREATININE 0.93 11/01/2024       Lab Results   Component Value Date    HGBA1C 6.1 (H) 11/01/2024        Lab Results   Component Value Date    CHOL 174 02/21/2024    CHOL 137 05/12/2023     Lab Results   Component Value Date    HDL 52.2 02/21/2024    HDL 48.5 05/12/2023     Lab Results   Component Value Date    LDLCALC 101 (H) 02/21/2024     Lab Results   Component Value Date    TRIG 105 02/21/2024    TRIG 107 05/12/2023     No components found for: \"CHOLHDL\"    Imaging:  Transthoracic echo (TTE) complete    Result Date: 11/13/2024   [facility], [address] [place] [address]                Tel [phone] and Fax [phone] TRANSTHORACIC ECHOCARDIOGRAM " REPORT  Patient Name:       ZEE COHEN     Rema Physician:    [id number] Arnulfo Duenas MD Study Date:         11/12/2024          Ordering Provider:    [id number] TOMAS BROWNE MRN/PID:            [id number]            Fellow: Accession#:         [id number]        Nurse: Date of Birth/Age:  1970 / 54      Sonographer:          Shari OSORIO Gender assigned at  F                   Additional Staff: Birth: Height:             165.10 cm           Admit Date: Weight:             117.93 kg           Admission Status:     Outpatient BSA / BMI:          2.21 m2 / 43.27     Encounter#:           [id number]                     kg/m2 Blood Pressure:     109/72 mmHg         Department Location:  [facility]                                                               Non Invasive Study Type:    TRANSTHORACIC ECHO (TTE) COMPLETE Diagnosis/ICD: Chronic systolic (congestive) heart failure (CHF)-I50.22 Indication:    Congestive Heart Failure CPT Code:      Echo Complete w Full Doppler-[id number] Patient History: Pertinent History: CP, CHF, asthma, LE edema, HLD, HTN, MIRI, cardiomyopathy. Study Detail: The following Echo studies were performed: 2D, M-Mode, Doppler and               color flow. Technically challenging study due to body habitus.  PHYSICIAN INTERPRETATION: Left Ventricle: Left ventricular ejection fraction is normal, calculated by Ornelas's biplane at 62%. There are no regional left ventricular wall motion abnormalities. The left ventricular cavity size is normal. There is mildly increased septal and normal posterior left ventricular wall thickness. Abnormal (paradoxical) septal motion, consistent with an intraventricular conduction delay. Left ventricular diastolic filling is indeterminate. Left Atrium: The left atrium is  upper limits of normal in size. Right Ventricle: The right ventricle is normal in size. There is normal right ventricular global systolic function. Right Atrium: The right atrium is normal in size. Aortic Valve: The aortic valve is probably trileaflet. There is mild aortic valve regurgitation. The peak instantaneous gradient of the aortic valve is 15 mmHg. Mitral Valve: The mitral valve is normal in structure. There is trace mitral valve regurgitation. Tricuspid Valve: The tricuspid valve is structurally normal. There is mild tricuspid regurgitation. Pulmonic Valve: The pulmonic valve is not well visualized. There is trace pulmonic valve regurgitation. Pericardium: There is no pericardial effusion noted. Aorta: The aortic root is normal. There is no dilatation of the ascending aorta. There is no dilatation of the aortic root. Pulmonary Artery: The tricuspid regurgitant velocity is 2.56 m/s, and with an estimated right atrial pressure of 3 mmHg, the estimated pulmonary artery pressure is borderline elevated with the RVSP at 29.1 mmHg. Systemic Veins: The inferior vena cava appears normal in size. In comparison to the previous echocardiogram(s): Compared with study dated 8/10/2021, no significant change.  CONCLUSIONS:  1. Poorly visualized anatomical structures due to suboptimal image quality.  2. Left ventricular ejection fraction is normal, calculated by Ornelas's biplane at 62%.  3. There is normal right ventricular global systolic function.  4. Mild aortic valve regurgitation. QUANTITATIVE DATA SUMMARY:  2D MEASUREMENTS:          Normal Ranges: Ao Root d:       2.80 cm  (2.0-3.7cm) LAs:             3.63 cm  (2.7-4.0cm) IVSd:            0.95 cm  (0.6-1.1cm) LVPWd:           0.80 cm  (0.6-1.1cm) LVIDd:           5.05 cm  (3.9-5.9cm) LVIDs:           3.23 cm LV Mass Index:   70 g/m2 LVEDV Index:     57 ml/m2 LV % FS          36.0 %  LA VOLUME:                    Normal Ranges: LA Vol A4C:        68.0 ml     (22+/-6mL/m2) LA Vol A2C:        76.2 ml LA Vol BP:         74.8 ml LA Vol Index A4C:  30.7ml/m2 LA Vol Index A2C:  34.5 ml/m2 LA Vol Index BP:   33.8 ml/m2 LA Area A4C:       20.0 cm2 LA Area A2C:       22.0 cm2 LA Major Axis A4C: 5.0 cm LA Major Axis A2C: 5.4 cm LA Volume Index:   33.0 ml/m2  RA VOLUME BY A/L METHOD:            Normal Ranges: RA Vol A4C:              45.3 ml    (8.3-19.5ml) RA Vol Index A4C:        20.5 ml/m2 RA Area A4C:             16.0 cm2 RA Major Axis A4C:       4.8 cm  M-MODE MEASUREMENTS:         Normal Ranges: Ao Root:             2.90 cm (2.0-3.7cm) LAs:                 4.00 cm (2.7-4.0cm)  AORTA MEASUREMENTS:         Normal Ranges: Asc Ao, d:          3.40 cm (2.1-3.4cm) Ao Arch:            2.60 cm (2.0-3.6cm)  LV SYSTOLIC FUNCTION BY 2D PLANIMETRY (MOD):                      Normal Ranges: EF-A4C View:    56 % (>=55%) EF-A2C View:    68 % EF-Biplane:     62 % LV EF Reported: 62 %  LV DIASTOLIC FUNCTION:             Normal Ranges: MV Peak E:             0.89 m/s    (0.7-1.2 m/s) MV Peak A:             0.88 m/s    (0.42-0.7 m/s) E/A Ratio:             1.02        (1.0-2.2) MV e'                  0.085 m/s   (>8.0) MV lateral e'          0.09 m/s MV medial e'           0.08 m/s MV A Dur:              118.80 msec E/e' Ratio:            10.51       (<8.0) a'                     0.10 m/s PulmV Sys Jean:         58.83 cm/s PulmV Rebolledo Jean:        46.69 cm/s PulmV S/D Jean:         1.26 PulmV A Revs Jean:      25.83 cm/s PulmV A Revs Dur:      108.42 msec  MITRAL VALVE:          Normal Ranges: MV DT:        267 msec (150-240msec)  AORTIC VALVE:            Normal Ranges: AoV Vmax:      1.90 m/s  (<=1.7m/s) AoV Peak P.5 mmHg (<20mmHg) LVOT Max Jean:  0.96 m/s  (<=1.1m/s) LVOT VTI:      19.53 cm LVOT Diameter: 2.17 cm   (1.8-2.4cm) AoV Area,Vmax: 1.86 cm2  (2.5-4.5cm2)  AORTIC INSUFFICIENCY: AI Vmax:       4.53 m/s AI Half-time:  596 msec AI Decel Time: 2055 msec AI Decel Rate: 225.34 cm/s2   RIGHT VENTRICLE: RV Basal 4.10 cm RV Mid   3.10 cm RV Major 8.8 cm TAPSE:   27.0 mm RV s'    0.17 m/s  TRICUSPID VALVE/RVSP:          Normal Ranges: Peak TR Velocity:     2.56 m/s RV Syst Pressure:     29 mmHg  (< 30mmHg) IVC Diam:             1.50 cm  PULMONIC VALVE:          Normal Ranges: PV Accel Time:  149 msec (>120ms) PV Max Jean:     0.9 m/s  (0.6-0.9m/s) PV Max PG:      3.2 mmHg  Pulmonary Veins: PulmV A Revs Dur: 108.42 msec PulmV A Revs Jean: 25.83 cm/s PulmV Rebolledo Jean:   46.69 cm/s PulmV S/D Jean:    1.26 PulmV Sys Jean:    58.83 cm/s  AORTA: Asc Ao Diam 3.14 cm  [id number] Arnulfo Duenas MD Electronically signed on 11/13/2024 at 5:20:57 PM  ** Final **     US thyroid    Result Date: 11/4/2024  Interpreted By:  Mich Hernandez and Beyersdorf Conner STUDY: US THYROID;  11/1/2024 2:42 pm   INDICATION: Signs/Symptoms:thyroid nodule on exam.   ,R13.10 Dysphagia, unspecified   COMPARISON: None.   ACCESSION NUMBER(S): [id number]   ORDERING CLINICIAN: JOON RBO   TECHNIQUE: Multiple ultrasonographic images of the thyroid gland were obtained. This examination is interpreted at [facility].   FINDINGS:   RIGHT LOBE: The right lobe of the thyroid measures 1.9 cm x 2.7 cm x 5.7 cm. The right lobe of the thyroid is homogeneous in echotexture and demonstrates no nodules. There is a cystic lesion measuring 0.3 X 0.1 X 0.2 cm.   LEFT LOBE: The left lobe of the thyroid measures 2.0 cm x 2.0 cm x 4.8 cm. The left lobe of the thyroid is homogeneous in echotexture and demonstrates no nodules. There is a cystic lesion measuring 0.4 X 0.3 X 0.3 cm.   ISTHMUS: The isthmus measures approximately 0.67cm and is homogeneous in echotexture and without any identifiable nodules.   CERVICAL LYMPH NODES: A few non enlarged cervical lymph nodes are present with normal appearance.       The thyroid gland is normal in appearance and homogeneous in echotexture. No suspicious thyroid nodules  are evident.   I personally reviewed the image(s)/study and resident interpretation. I agree with the findings as stated by resident Mitul Martinez. Data analyzed and images interpreted at [facility], [place], [place].   MACRO: None     Signed by: Mich Hernandez 11/4/2024 10:45 AM Dictation workstation:   [id number]    US PELVIS TRANSABDOMINAL WITH TRANSVAGINAL    Result Date: 11/4/2024  Interpreted By:  Mich Hernandez and Beyersdorf Conner STUDY: US PELVIS TRANSABDOMINAL WITH TRANSVAGINAL;  11/1/2024 3:38 pm   INDICATION: Signs/Symptoms:pelvic pressure.   ,Z13.9 Encounter for screening, unspecified   COMPARISON: None.   ACCESSION NUMBER(S): [id number]   ORDERING CLINICIAN: JOON BRO   TECHNIQUE: Multiple multiplanar static gray scale, color and spectral waveform sonographic images of the pelvis were obtained. Transabdominal and endovaginal ultrasound was performed.  This examination was interpreted at [facility].   FINDINGS:   UTERUS: The uterus measures  9.60cm x 7.54cm x 14.31cm. Numerous large predominantly hypoechoic, heterogenous nodules with edge artifact, likely representing leiomyomas are identified, and measure as follows: 3.8 X 3.4 X 4.6 cm 5.1 X 5.0 X 4.6 cm 3.3 X 2.8 X 3.0 cm 3.3 X 2.7 X 2.6 cm 5.5 X 4.8 X 6.2 cm. Additional similar-appearing lesions are present, and not measured.   ENDOMETRIUM: The endometrium measures a thickness of not seen, which is normal.   RIGHT ADNEXA: The right ovary was not visualized.   LEFT ADNEXA: The left ovary was not visualized.   CUL DE SAC: No gross free fluid is seen in the pelvic cul-de-sac.       1. Numerous predominantly hypoechoic heterogenous myometrial lesions within the uterus are most consistent with leiomyomas, with the largest measuring up to 5.5 X 4.8 X 6.2 cm, as described above. 2. Suboptimal evaluation of the pelvis with nonvisualized left and right ovaries. No adnexal  mass is evident.   I personally reviewed the image(s)/study and resident interpretation. I agree with the findings as stated by resident Mitul Martinez. Data analyzed and images interpreted at [facility], [place], [place].   MACRO: None   Signed by: Mich Hernandez 11/4/2024 10:44 AM Dictation workstation:   [id number]    ECG 12 Lead    Result Date: 10/24/2024  Normal sinus rhythm Normal ECG When compared with ECG of 18-JUL-2022 10:30, T wave inversion no longer evident in Anterior leads Confirmed by Kamari Sexton ([id number]) on 10/24/2024 12:14:10 PM      Assessment and plan:  Carol Arevalo is a 54 y.o. female w/ PMHx NICM/sys HFrecEF (EF 62% 11/2024), morbid obesity, remote history of PE, depressive symptoms, asthma, MIRI, and chronic body pain who presents for 5 month follow-up. Overall, patient has no acute complaints and is here for routine follow-up and to go over labs and imaging results - which were remarkable for asymptomatic leiomyomas on pelvic US and prediabetic A1c of 6.1%. Main goals today were centered around lifestyle modifications for weight loss. Also, PT referral placed for chronic back pain, deconditioning, and LE lymphedema (for stocking fitting). Sleep med referral for CPAP (mod MIRI on sleep study).    #Lifestyle modifications  -Diet: Reduce foods with high caloric content such as fried foods, ravioli, and hotdogs and replace these with low-fat baked foods.  Also avoid fruit juices ([employer]/smoothies instead) and avoid excessive consumption of fruits.  -Exercise: PT referral placed for chronic back pain, deconditioning, and LE lymphedema (for stocking fitting).   -Weight loss: Patient was not interested in pursuing pharmacological weight loss at this time, can revisit this at a later date if lifestyle modifications are not successful    #GERD  #Atypical Chest pain (improved)  #Trouble swallowing (improved)  - Cluster of symptoms most concerning for possible  GERD. -- Resolved w/ omeprazole use  - Low concern for cardiac chest pain given lack of typical symptoms and normal stress test in 2020  -low c/f thyroid issue given normal TSH w/ reflex and normal thyroid US  Plan:   - Continue with omeprazole     #HFrecEF (62%) 2/2 NICM  #HTN  #Orthostatic Hypotension (resolved)  - TTE 11/2024: EF 62%, mild AR, paradoxical septal wall motion  - Lipid panel 2/2024: Chol 174, HDL-c 52.2, LDL-c: 101, TRIG 105.   Plan:  - GDMT: c/w carvedilol 25 mg bid, spironolactone 100 mg daily, c/w Losartan 25 mg daily   - c/w furosemide 40mg BID  -hydralazine 10 mg BID, isosorbide mononitrate 30 mg daily - can consider uptitrating losartan to wean off of these, but will leave this to cardiology  - Follows w/ Dr Sainz for Cardiology    #Asymptomatic Leiomyomas  #Pelvic pressure (improved)  - Pelvic/transvaginal U/S ordered by OB/GYN: numerous leiomyomas  -Denies significant symptoms or AUB     #Asthma?  #Allergic rhinitis  - No PFTs on file  -Rarely experiences symptoms  Plan:  -Active PFT not done yet, pt will plan to schedule this  - Continue with Flonase 1 spray each nostril daily. (Uses PRN)  - C/w cetirizine daily as needed.  -C/w albuterol nebs/rescue inhaler as needed     #Moderate MIRI  - Sleep study 6/2024: moderate obstructive sleep apnea, If PAP therapy will be pursued, consider empiric initiation of auto-adjusting   CPAP with settings of 5-15 cm H2O   Plan:  -placed sleep medicine referral to obtain CPAP machine - recommend this to assist w/ weight loss and HTN     #Musculoskeletal back pain  #Aches and Pains  - Right latissimus dorsi pain  - Patient has job which requires lifting a lot of objects off ground  - Patient endorsing aches and pains as well as nonspecific symptoms after her COVID-19 infection   Plan:   - Continue tylenol prn.   -PT order placed     #Health Maintenance  - Immunizations: Influenza (will obtain at pharmacy), Tdap (given today, 11/2024), COVID (due), PPV23  (given 2015, due for PCV20). Shingles (due). Patient states that she has gotten her childhood vaccines.   - Screening:   - Cardiovascular:   The 10-year ASCVD risk score (Lindsay SANCHEZ, et al., 2019) is: 2.1%    Values used to calculate the score:      Age: 54 years      Sex: Female      Is Non- : Yes      Diabetic: No      Tobacco smoker: No      Systolic Blood Pressure: 105 mmHg      Is BP treated: Yes      HDL Cholesterol: 52.2 mg/dL      Total Cholesterol: 174 mg/dL  - Diabetes: A1c 6.1%, encourage lifestyle modifications as above  - Cancer: Breast (ordered Mammogram, Cervical (Pap smear due in 2027), Colorectal (pt will schedule cscope), Lung (Not indicated)  - Infectious Disease: HIV, Hep C, Syphilis negative 2024  - Osteoporosis: Not currently indicated    Follow-up in 6 months.    Patient and plan discussed with attending physician Dr. Patrick.    Roshan Santos MD

## 2024-12-27 ENCOUNTER — OFFICE VISIT (OUTPATIENT)
Dept: OBSTETRICS AND GYNECOLOGY | Facility: HOSPITAL | Age: 54
End: 2024-12-27
Payer: COMMERCIAL

## 2024-12-27 VITALS
DIASTOLIC BLOOD PRESSURE: 86 MMHG | SYSTOLIC BLOOD PRESSURE: 134 MMHG | BODY MASS INDEX: 41.48 KG/M2 | HEIGHT: 65 IN | WEIGHT: 249 LBS

## 2024-12-27 DIAGNOSIS — D25.1 INTRAMURAL AND SUBMUCOUS LEIOMYOMA OF UTERUS: Primary | ICD-10-CM

## 2024-12-27 DIAGNOSIS — D21.9 LEIOMYOMA: ICD-10-CM

## 2024-12-27 DIAGNOSIS — D25.0 INTRAMURAL AND SUBMUCOUS LEIOMYOMA OF UTERUS: Primary | ICD-10-CM

## 2024-12-27 PROCEDURE — 99213 OFFICE O/P EST LOW 20 MIN: CPT | Performed by: OBSTETRICS & GYNECOLOGY

## 2024-12-27 PROCEDURE — 3008F BODY MASS INDEX DOCD: CPT | Performed by: OBSTETRICS & GYNECOLOGY

## 2024-12-27 PROCEDURE — 1036F TOBACCO NON-USER: CPT | Performed by: OBSTETRICS & GYNECOLOGY

## 2024-12-27 SDOH — ECONOMIC STABILITY: FOOD INSECURITY: WITHIN THE PAST 12 MONTHS, YOU WORRIED THAT YOUR FOOD WOULD RUN OUT BEFORE YOU GOT MONEY TO BUY MORE.: NEVER TRUE

## 2024-12-27 SDOH — ECONOMIC STABILITY: FOOD INSECURITY: WITHIN THE PAST 12 MONTHS, THE FOOD YOU BOUGHT JUST DIDN'T LAST AND YOU DIDN'T HAVE MONEY TO GET MORE.: NEVER TRUE

## 2024-12-27 ASSESSMENT — ENCOUNTER SYMPTOMS
ABDOMINAL PAIN: 0
COUGH: 0
PALPITATIONS: 0
HEMATURIA: 0
SHORTNESS OF BREATH: 0
FEVER: 0
CHILLS: 0
FREQUENCY: 0
HEADACHES: 0
VOMITING: 0
NAUSEA: 0
DYSURIA: 0
WEAKNESS: 0
CONSTIPATION: 0
DIZZINESS: 0
DIARRHEA: 0

## 2024-12-27 ASSESSMENT — PATIENT HEALTH QUESTIONNAIRE - PHQ9
2. FEELING DOWN, DEPRESSED OR HOPELESS: NOT AT ALL
SUM OF ALL RESPONSES TO PHQ9 QUESTIONS 1 & 2: 0
1. LITTLE INTEREST OR PLEASURE IN DOING THINGS: NOT AT ALL

## 2024-12-27 ASSESSMENT — PAIN SCALES - GENERAL: PAINLEVEL_OUTOF10: 0-NO PAIN

## 2024-12-27 NOTE — PROGRESS NOTES
SUBJECTIVE    54 y.o.  Perimenopausal presents for   Chief Complaint   Patient presents with    Fibroids     Discuss U/S done   Declined chaperone FC MA        Patient overall doing well. Reports 3 menstrual cycles in the last year, last . Has occasional lower abdominal pelvic pain and pressure. No urinary symptoms. Some constipation which she attributes to iron Rx.     OB/GYN History  No LMP recorded (exact date). Patient is perimenopausal.    Social History     Substance and Sexual Activity   Sexual Activity Not Currently    Partners: Male    Birth control/protection: None       Sexually transmitted infections:no past history    OB History    Para Term  AB Living   3 3 3     3   SAB IAB Ectopic Multiple Live Births           3      # Outcome Date GA Lbr Tony/2nd Weight Sex Type Anes PTL Lv   3 Term 09/10/09    F Vag-Spont   NARAYAN   2 Term 97    F Vag-Spont   NARAYAN   1 Term 93    M Vag-Spont   NARAYAN       The following portions of the chart were reviewed this encounter and updated as appropriate:           Screenings  Social Drivers of Health     Tobacco Use: Low Risk  (2024)    Patient History     Smoking Tobacco Use: Never     Smokeless Tobacco Use: Never     Passive Exposure: Not on file   Alcohol Use: Not on file   Financial Resource Strain: Not on file   Food Insecurity: No Food Insecurity (2024)    Hunger Vital Sign     Worried About Running Out of Food in the Last Year: Never true     Ran Out of Food in the Last Year: Never true   Transportation Needs: Not on file   Physical Activity: Not on file   Stress: No Stress Concern Present (2024)    [employer] - Occupational Stress Questionnaire     Feeling of Stress : Only a little   Social Connections: Not on file   Intimate Partner Violence: Not At Risk (2024)    Humiliation, Afraid, Rape, and Kick questionnaire     Fear of Current or Ex-Partner: No      "Emotionally Abused: No     Physically Abused: No     Sexually Abused: No   Depression: Not at risk (12/27/2024)    PHQ-2     PHQ-2 Score: 0   Housing Stability: Unknown (11/29/2023)    Housing Stability Vital Sign     Unable to Pay for Housing in the Last Year: No     Number of Places Lived in the Last Year: Not on file     Unstable Housing in the Last Year: No   Utilities: Not on file   Digital Equity: Not on file   Health Literacy: Not on file         Review of Systems  Review of Systems   Constitutional:  Negative for chills and fever.   Eyes:  Negative for visual disturbance.   Respiratory:  Negative for cough and shortness of breath.    Cardiovascular:  Negative for chest pain and palpitations.   Gastrointestinal:  Negative for abdominal pain, constipation, diarrhea, nausea and vomiting.   Genitourinary:  Positive for pelvic pain. Negative for dyspareunia, dysuria, frequency, hematuria, urgency, vaginal bleeding and vaginal discharge.   Neurological:  Negative for dizziness, weakness and headaches.        OBJECTIVE  Vitals:    12/27/24 1003   BP: 134/86   Weight: 113 kg (249 lb)   Height: 1.651 m (5' 5\")     Body mass index is 41.44 kg/m².     Physical Exam  Constitutional:       Appearance: Normal appearance.   HENT:      Head: Normocephalic and atraumatic.      Nose: Nose normal.      Mouth/Throat:      Mouth: Mucous membranes are moist.   Eyes:      Extraocular Movements: Extraocular movements intact.      Pupils: Pupils are equal, round, and reactive to light.   Cardiovascular:      Rate and Rhythm: Normal rate.   Pulmonary:      Effort: Pulmonary effort is normal.   Musculoskeletal:         General: Normal range of motion.      Cervical back: Normal range of motion.   Neurological:      General: No focal deficit present.      Mental Status: She is alert and oriented to person, place, and time.   Skin:     General: Skin is warm and dry.   Psychiatric:         Mood and Affect: Mood normal.         Behavior: " Behavior normal.   Vitals and nursing note reviewed.          Last Pap: approximate date 01/2022 and was normal    Immunization History   Administered Date(s) Administered    Flu vaccine (IIV4), preservative free *Check age/dose* 11/10/2015, 12/22/2020    Flu vaccine, trivalent, preservative free, age 6 months and greater (Fluarix/Fluzone/Flulaval) 10/11/2013, 11/10/2015    Influenza, Unspecified 10/14/2008    Pneumococcal polysaccharide vaccine, 23-valent, age 2 years and older (PNEUMOVAX 23) 11/10/2015    Td (adult), unspecified 07/18/2006    Tdap vaccine, age 7 year and older (BOOSTRIX, ADACEL) 11/20/2024      ASSESSMENT & PLAN  Problem List Items Addressed This Visit          Ob-Gyn Problems    Intramural and submucous leiomyoma of uterus - Primary    Overview     - Pelvic ultrasound 11/2024: The uterus measures  9.60cm x 7.54cm x 14.31cm. Numerous large predominantly hypoechoic, heterogenous nodules with edge artifact, likely representing leiomyomas are identified, and measure as follows:  3.8 X 3.4 X 4.6 cm  5.1 X 5.0 X 4.6 cm  3.3 X 2.8 X 3.0 cm  3.3 X 2.7 X 2.6 cm  5.5 X 4.8 X 6.2 cm.  - Reviewed ultrasound results at length and discussed diagnosis of uterine fibroids. Patient has had fibroids since age 19 - slight increase in size since 2016 US. Explained that fibroids are benign neoplasms that become increasingly common with age, affecting up to 70% of women by menopause. Reviewed common symptoms including heavy menstrual bleeding resulting in anemia and bulk symptoms such as pelvic pressure, urinary frequency, and constipation.   - Reviewed options for management including expectant, medical, and surgical options.  - Patient mildly symptomatic/perimenopausal and desires expectant management. Discussed that this is a reasonable approach and I would not expect bleeding characteristics, hemoglobin levels, or fibroid size to worsen in a clinically significant way over the next year. Additionally, expect  fibroid symptoms to improve with menopause due to the lack of estrogen/progesterone to sustain their growth as well as the natural cessation of menses. Encouraged follow-up if symptoms become bothersome or if active management or pregnancy is desired.  - She would like to avoid surgical intervention if possible             Follow up: As needed    Donna Carreno MD  Obstetrics & Gynecology  12/27/24

## 2025-01-04 DIAGNOSIS — K21.9 GASTRO-ESOPHAGEAL REFLUX DISEASE WITHOUT ESOPHAGITIS: ICD-10-CM

## 2025-01-06 RX ORDER — OMEPRAZOLE 40 MG/1
40 CAPSULE, DELAYED RELEASE ORAL
Qty: 30 CAPSULE | Refills: 3 | Status: SHIPPED | OUTPATIENT
Start: 2025-01-06

## 2025-03-11 DIAGNOSIS — I50.32 CHRONIC DIASTOLIC CONGESTIVE HEART FAILURE: ICD-10-CM

## 2025-03-12 RX ORDER — ISOSORBIDE MONONITRATE 30 MG/1
30 TABLET, EXTENDED RELEASE ORAL DAILY
Qty: 30 TABLET | Refills: 5 | Status: SHIPPED | OUTPATIENT
Start: 2025-03-12 | End: 2026-03-12

## 2025-03-14 ENCOUNTER — OFFICE VISIT (OUTPATIENT)
Dept: PRIMARY CARE | Facility: HOSPITAL | Age: 55
End: 2025-03-14
Payer: COMMERCIAL

## 2025-03-14 VITALS
HEART RATE: 73 BPM | WEIGHT: 253.3 LBS | SYSTOLIC BLOOD PRESSURE: 124 MMHG | OXYGEN SATURATION: 98 % | HEIGHT: 65 IN | TEMPERATURE: 97.9 F | DIASTOLIC BLOOD PRESSURE: 83 MMHG | BODY MASS INDEX: 42.2 KG/M2

## 2025-03-14 DIAGNOSIS — D22.9 NEVUS: ICD-10-CM

## 2025-03-14 DIAGNOSIS — K21.9 GASTRO-ESOPHAGEAL REFLUX DISEASE WITHOUT ESOPHAGITIS: ICD-10-CM

## 2025-03-14 DIAGNOSIS — E66.01 MORBID OBESITY (MULTI): ICD-10-CM

## 2025-03-14 DIAGNOSIS — M54.50 CHRONIC BILATERAL LOW BACK PAIN, UNSPECIFIED WHETHER SCIATICA PRESENT: ICD-10-CM

## 2025-03-14 DIAGNOSIS — G47.33 OSA (OBSTRUCTIVE SLEEP APNEA): Primary | ICD-10-CM

## 2025-03-14 DIAGNOSIS — G89.29 CHRONIC BILATERAL LOW BACK PAIN, UNSPECIFIED WHETHER SCIATICA PRESENT: ICD-10-CM

## 2025-03-14 DIAGNOSIS — Z12.11 ENCOUNTER FOR SCREENING COLONOSCOPY: ICD-10-CM

## 2025-03-14 PROCEDURE — 3008F BODY MASS INDEX DOCD: CPT

## 2025-03-14 PROCEDURE — 99214 OFFICE O/P EST MOD 30 MIN: CPT | Mod: GC

## 2025-03-14 PROCEDURE — 99214 OFFICE O/P EST MOD 30 MIN: CPT

## 2025-03-14 RX ORDER — OMEPRAZOLE 40 MG/1
40 CAPSULE, DELAYED RELEASE ORAL
Qty: 30 CAPSULE | Refills: 11 | Status: SHIPPED | OUTPATIENT
Start: 2025-03-14

## 2025-03-14 ASSESSMENT — ENCOUNTER SYMPTOMS
OCCASIONAL FEELINGS OF UNSTEADINESS: 0
LOSS OF SENSATION IN FEET: 1
DEPRESSION: 0

## 2025-03-14 ASSESSMENT — PATIENT HEALTH QUESTIONNAIRE - PHQ9
SUM OF ALL RESPONSES TO PHQ9 QUESTIONS 1 AND 2: 0
1. LITTLE INTEREST OR PLEASURE IN DOING THINGS: NOT AT ALL
2. FEELING DOWN, DEPRESSED OR HOPELESS: NOT AT ALL

## 2025-03-14 ASSESSMENT — PAIN SCALES - GENERAL: PAINLEVEL_OUTOF10: 3

## 2025-03-14 NOTE — PROGRESS NOTES
Chief complaint: Routine Follow-up    HPI:  Carol Arevalo is a 54 y.o. female w/ PMHx NICM/sys HFrecEF (EF 62% 11/2024), morbid obesity, remote history of PE, depressive symptoms, asthma, MIRI, and chronic body pain who presents for 4 month follow-up.     Overall, patient has no acute complaints and is here for routine follow-up.  The main focus of the previous visit was lifestyle modifications and weight loss.  She has done a great job with modifying her diet and is now avoiding fried foods, excessive consumption of fruits, and fruit juices and is largely replaced her previous diet with a home-cooked low-fat diet.  Unfortunately, she has not been able to increase the amount of activity she has done but she is confident that she will be walking much more outside as the weather gets nicer.  Of note, she has lost 6 pounds since her last visit in November 2024.    Patient is concerned about the multiple small melanocytic nevi and a butterfly pattern under her eyes.  Though I assured her that these are likely not worrisome, she would like to see a dermatologist to explore options for removal.    Pt lost her home to the [facility] in November 2024 (demolished due to lack of maintenance) and has been living with her mother. She would like to pursue housing options and would like the help of our .    Multiple referrals were made at the last visit, none of which were completed.  Patient would like me to replace these referrals: For physical therapy, sleep medicine, screening colonoscopy.    Medications:  Current Outpatient Medications   Medication Instructions    acetaminophen (TYLENOL) 975 mg, oral, Every 8 hours PRN    albuterol (ProAir HFA) 90 mcg/actuation inhaler 2 puffs, inhalation, Every 4 hours PRN    albuterol 2.5 mg, nebulization, Every 6 hours PRN    carvedilol (COREG) 25 mg, oral, 2 times daily    cetirizine (ZYRTEC) 10 mg, oral, Daily PRN    ferrous sulfate 325 mg, oral, Daily with breakfast     fluticasone (Flonase) 50 mcg/actuation nasal spray 1 spray, Each Nostril, Daily, Shake gently. Before first use, prime pump. After use, clean tip and replace cap.    furosemide (Lasix) 40 mg tablet TAKE 1 TABLET TWICE DAILY AND AN ADDITIONAL TABLET AS NEEDED FOR SWELLING OR WEIGHT GAIN    hydrALAZINE (APRESOLINE) 10 mg, oral, 2 times daily    isosorbide mononitrate ER (IMDUR) 30 mg, oral, Daily, Do not crush or chew.    losartan (COZAAR) 25 mg, oral, Daily    nebulizers misc Please dispense one nebulizer    omeprazole (PRILOSEC) 40 mg, oral, Daily before breakfast, Do not crush or chew    spironolactone (ALDACTONE) 100 mg, oral, Daily    tirzepatide (weight loss) (ZEPBOUND) 2.5 mg, subcutaneous, Every 7 days       Allergies:  Allergies   Allergen Reactions    Naproxen Dizziness       Past medical history:  Past Medical History:   Diagnosis Date    Acute sinusitis, unspecified 11/10/2016    Subacute sinusitis, unspecified location    Acute streptococcal tonsillitis, unspecified 04/13/2017    Strep tonsillitis    Cardiomyopathy, unspecified 09/14/2021    Cardiomyopathy    Cervicalgia 05/26/2017    Spine pain, cervical    Encounter for gynecological examination (general) (routine) without abnormal findings 05/31/2016    Encounter for annual routine gynecological examination    Encounter for gynecological examination (general) (routine) without abnormal findings 06/10/2016    Well woman exam with routine gynecological exam    Encounter for other general counseling and advice on contraception 05/31/2016    Counseling for birth control, intrauterine device    Encounter for other screening for malignant neoplasm of breast 11/27/2017    Screening for breast cancer    Encounter for screening for infections with a predominantly sexual mode of transmission 05/31/2016    Routine screening for STI (sexually transmitted infection)    Encounter for screening for infections with a predominantly sexual mode of transmission  03/24/2015    Routine screening for STI (sexually transmitted infection)    Encounter for screening for infections with a predominantly sexual mode of transmission     Screening for STDs (sexually transmitted diseases)    Encounter for screening for malignant neoplasm of cervix 01/14/2022    Cervical cancer screening    Essential (primary) hypertension     Chronic hypertension    Morbid (severe) obesity due to excess calories (Multi) 03/20/2018    Obesity, morbid (more than 100 lbs over ideal weight or BMI > 40)    Other chest pain 05/04/2017    Atypical chest pain    Other conditions influencing health status     History of vaginal delivery    Other disorders of electrolyte and fluid balance, not elsewhere classified 10/15/2016    Electrolyte and fluid disorder    Other problems related to lifestyle 05/31/2016    Other problems related to lifestyle    Other problems related to lifestyle     Other problems related to lifestyle    Other pulmonary embolism without acute cor pulmonale 12/18/2020    Pulmonary embolism    Personal history of other benign neoplasm 08/30/2016    History of uterine leiomyoma    Personal history of other diseases of the circulatory system     History of congestive heart failure    Personal history of other diseases of the circulatory system 08/27/2013    History of sinus tachycardia    Personal history of other diseases of the digestive system 05/23/2017    History of esophageal reflux    Personal history of other diseases of the musculoskeletal system and connective tissue 10/13/2017    History of muscle pain    Personal history of other diseases of the respiratory system     History of sore throat    Personal history of other diseases of the respiratory system 11/10/2016    History of sinusitis    Personal history of other specified conditions 10/15/2016    History of dizziness    Personal history of other specified conditions 09/03/2015    History of bradycardia    Polyp of cervix uteri  02/11/2022    Cervical polyp    Unspecified asthma, uncomplicated ([facility]) 12/22/2021    Asthma       Surgical history:  Past Surgical History:   Procedure Laterality Date    OTHER SURGICAL HISTORY  03/24/2015    Biopsy Vulvar       Family history:  No family history on file.    Social history:   reports that she has never smoked. She has never used smokeless tobacco. She reports current alcohol use. She reports that she does not currently use drugs.    Health maintenance:  Health Maintenance   Topic Date Due    Yearly Adult Physical  Never done    MMR Vaccines (1 of 1 - Standard series) Never done    Hepatitis B Vaccines (1 of 3 - 19+ 3-dose series) Never done    Pneumococcal Vaccine (2 of 2 - PCV) 11/10/2016    Zoster Vaccines (1 of 2) Never done    Mammogram  10/25/2023    Influenza Vaccine (1) 09/01/2024    COVID-19 Vaccine (1 - 2024-25 season) Never done    Cervical Cancer Screening  01/14/2025    Creatinine Level  11/01/2025    Potassium Level  11/01/2025    Diabetes: Hemoglobin A1C  11/01/2025    Diabetes Screening  11/01/2025    Echocardiogram  11/12/2025    Lipid Panel  02/21/2029    Colorectal Cancer Screening  12/16/2030    DTaP/Tdap/Td Vaccines (2 - Td or Tdap) 11/20/2034    HIV Screening  Completed    Hepatitis C Screening  Completed    HIB Vaccines  Aged Out    IPV Vaccines  Aged Out    Hepatitis A Vaccines  Aged Out    Meningococcal Vaccine  Aged Out    Rotavirus Vaccines  Aged Out    HPV Vaccines  Aged Out    Irritable Bowel Syndrome  Discontinued       Review of systems:  Negative other than noted above.    Vitals:  Vitals:    03/14/25 1446   BP: 124/83   Pulse: 73   Temp: 36.6 °C (97.9 °F)   SpO2: 98%       Physical exam:  Physical Exam  Constitutional:       General: She is not in acute distress.     Appearance: She is obese. She is not ill-appearing.   HENT:      Mouth/Throat:      Mouth: Mucous membranes are moist.   Eyes:      Extraocular Movements: Extraocular movements intact.  "  Cardiovascular:      Rate and Rhythm: Normal rate and regular rhythm.      Heart sounds: Normal heart sounds.   Pulmonary:      Effort: Pulmonary effort is normal.      Breath sounds: Normal breath sounds.   Abdominal:      General: There is no distension.      Palpations: Abdomen is soft.      Tenderness: There is no abdominal tenderness. There is no guarding or rebound.   Musculoskeletal:      Comments: Non-pitting LE edema in BL LE   Skin:     Comments: Multiple small melanocytic nevi bilaterally under eyes in butterfly pattern, none irregularly shaped, discolored, or large   Neurological:      General: No focal deficit present.      Mental Status: She is alert and oriented to person, place, and time. Mental status is at baseline.   Psychiatric:         Mood and Affect: Mood normal.         Behavior: Behavior normal.         Thought Content: Thought content normal.         Judgment: Judgment normal.         Labs:  Lab Results   Component Value Date    WBC 8.0 11/01/2024    HGB 11.5 (L) 11/01/2024    HCT 37.2 11/01/2024    MCV 94 11/01/2024     11/01/2024       Lab Results   Component Value Date    GLUCOSE 81 11/01/2024    CALCIUM 8.8 11/01/2024     11/01/2024    K 4.3 11/01/2024    CO2 29 11/01/2024     11/01/2024    BUN 14 11/01/2024    CREATININE 0.93 11/01/2024       Lab Results   Component Value Date    HGBA1C 6.1 (H) 11/01/2024        Lab Results   Component Value Date    CHOL 174 02/21/2024    CHOL 137 05/12/2023     Lab Results   Component Value Date    HDL 52.2 02/21/2024    HDL 48.5 05/12/2023     Lab Results   Component Value Date    LDLCALC 101 (H) 02/21/2024     Lab Results   Component Value Date    TRIG 105 02/21/2024    TRIG 107 05/12/2023     No components found for: \"CHOLHDL\"    Imaging:  No results found.    Assessment and plan:  Carol Arevalo is a 54 y.o. female w/ PMHx NICM/sys HFrecEF (EF 62% 11/2024), morbid obesity, remote history of PE, depressive symptoms, asthma, " MIRI, and chronic body pain who presents for 5 month follow-up.     Plan:  -Pt [place] the city [place] (demolished due to lack of maintenance) and has been living with her mother. She would like to pursue housing options and would like the help of our  - I will ask Aliyah to see if she can help  -Continue lifestyle modifications: doing great w/ diet, next is exercise and continuing weight loss  -Derm referral for multiple BL facial nevi. Nevi currently not concerning, but will keep an eye on this to ensure no changes  -Sent for CPAP machine, also reordered sleep medicine referral  -reordered screening cscope  -mammo still pending  -replaced referral to PT:  chronic back pain, deconditioning, and LE lymphedema (for stocking fitting)  - Will send test script for zepbound for indications of obesity and MIRI to see if insurance will cover  -RTC 6 months     #Lifestyle modifications  -Diet: doing great, balanced diet, homecooked, low fat  -Exercise: PT referral placed for chronic back pain, deconditioning, and LE lymphedema (for stocking fitting); pt will also plan to walk more w/ improving weather  -Weight loss: lost 6 lb since 11/2024. Continue diet and exercise as above. Will send test script for zepbound for indications of obesity and MIRI to see if insurance will cover; otherwise, continue lifestyle modifications as main weight loss method.     #Moderate MIRI  - Sleep study 6/2024: moderate obstructive sleep apnea, If PAP therapy will be pursued, consider empiric initiation of auto-adjusting CPAP with settings of 5-15 cm H2O   -educated pt that this untreated will make it harder to lose weight  Plan:  -replaced sleep medicine referral   - did not follow-up previously w/ sleep medicine, so I will just order her CPAP supplies to avoid further delays in therapy     #GERD  #Atypical Chest pain (improved)  #Trouble swallowing (improved)  - Cluster of symptoms most concerning for possible GERD.  -- Resolved w/ omeprazole use  - Low concern for cardiac chest pain given lack of typical symptoms and normal stress test in 2020  -low c/f thyroid issue given normal TSH w/ reflex and normal thyroid US  Plan:   - Continue with omeprazole     #HFrecEF (62%) 2/2 NICM  #HTN  #Orthostatic Hypotension (resolved)  - TTE 11/2024: EF 62%, mild AR, paradoxical septal wall motion  - Lipid panel 2/2024: Chol 174, HDL-c 52.2, LDL-c: 101, TRIG 105.   Plan:  - GDMT: c/w carvedilol 25 mg bid, spironolactone 100 mg daily, c/w Losartan 25 mg daily   - c/w furosemide 40mg BID  -hydralazine 10 mg BID, isosorbide mononitrate 30 mg daily - can consider uptitrating losartan to wean off of these, but will leave this to cardiology  - Follows w/ Dr Sainz for Cardiology, has appt in April    #Asymptomatic Leiomyomas  #Pelvic pressure (improved)  - Pelvic/transvaginal U/S ordered by OB/GYN: numerous leiomyomas  -Denies significant symptoms or AUB     #Asthma?  #Allergic rhinitis  - No PFTs on file  -Rarely experiences symptoms  Plan:  -Active PFT not done yet, pt will plan to schedule this  - Continue with Flonase 1 spray each nostril daily. (Uses PRN)  - C/w cetirizine daily as needed.  -C/w albuterol nebs/rescue inhaler as needed    #Musculoskeletal back pain  #Aches and Pains  - Right latissimus dorsi pain  - Patient has job which requires lifting a lot of objects off ground  Plan:   - Continue tylenol prn.   -PT order placed     #Health Maintenance  - Immunizations: Influenza (NA), Tdap (given 11/2024), COVID (due), PPV23 (given 2015, due for PCV20). Shingles (due). Patient states that she has gotten her childhood vaccines.   - Screening:   - Cardiovascular:   The 10-year ASCVD risk score (Lindsay SANCHEZ, et al., 2019) is: 3.8%    Values used to calculate the score:      Age: 54 years      Sex: Female      Is Non- : Yes      Diabetic: No      Tobacco smoker: No      Systolic Blood Pressure: 124 mmHg      Is BP treated:  Yes      HDL Cholesterol: 52.2 mg/dL      Total Cholesterol: 174 mg/dL    - Diabetes: A1c 6.1%, encourage lifestyle modifications as above  - Cancer: Breast (ordered Mammogram, Cervical (Pap smear due in 2027), Colorectal (reordered cscope), Lung (Not indicated)  - Infectious Disease: HIV, Hep C, Syphilis negative 2024  - Osteoporosis: Not currently indicated    Patient and plan discussed with attending physician Dr. Patrick.    Roshan Santos MD

## 2025-03-14 NOTE — PATIENT INSTRUCTIONS
As discussed today, our plan is:     Labs - we collected labs today and will call you with any abnormal results. If you have any questions or concerns prior to us calling feel free to call our office to have your questions addressed.   Medication changes: will try to send zepbound for weight loss and sleep apnea, we will see if this will be covered by insurance. You have refills for all of your medications  Consultations - you were referred to see the following specialists: Physical therapy, sleep medicine, dermatology, colonoscopy. You should receive a call from central scheduling in the next few days if you do not receive a call within 3-5 business days please call [phone] to schedule your appointment.   4.   If you smoke or use other tobacco products, take steps to quit. Call [phone] for more information or to set up an appointment with  [facility]. The [place] Tobacco Quit Line is a free resource for people who don’t have insurance, receive Medicaid, pregnant women, or members of the [place] [facility]. Call 2-208-QUIT-NOW or 1-168.230.5877.    Please come back to see us in: 6 months   ------  If you have any problems or questions, please contact the clinic at [phone] to leave a message. Our fax number is [phone]. If your issue cannot wait until the next business day, please go to urgent care or the emergency department.     I also strongly urge all of my patients to register for [email or web address] by going to: [email or web address]  (The  staff can also send you a text/email link to register when you check out).    No shows: It is understandable if you are unable to make it to a visit, but please cancel your appointment instead of not showing up. This helps to give other patients access to primary care and keeps wait times low.        [facility]   [phone]

## 2025-03-18 ENCOUNTER — TELEPHONE (OUTPATIENT)
Dept: PRIMARY CARE | Facility: HOSPITAL | Age: 55
End: 2025-03-18
Payer: COMMERCIAL

## 2025-03-18 NOTE — TELEPHONE ENCOUNTER
TRISHA spoke with patient about housing options. ALENS provided patient with information for [facility] as well as [facility] for case management services.

## 2025-03-20 NOTE — PROGRESS NOTES
I reviewed the resident/fellow's documentation and discussed the patient with the resident/fellow. I agree with the resident/fellow's medical decision making as documented in the note.     Migel Patrick MD MPH

## 2025-04-15 ENCOUNTER — OFFICE VISIT (OUTPATIENT)
Dept: CARDIOLOGY | Facility: HOSPITAL | Age: 55
End: 2025-04-15
Payer: COMMERCIAL

## 2025-04-15 VITALS
HEART RATE: 74 BPM | OXYGEN SATURATION: 98 % | SYSTOLIC BLOOD PRESSURE: 120 MMHG | BODY MASS INDEX: 43.25 KG/M2 | DIASTOLIC BLOOD PRESSURE: 83 MMHG | HEIGHT: 65 IN | WEIGHT: 259.6 LBS

## 2025-04-15 DIAGNOSIS — I50.32 CHRONIC DIASTOLIC CONGESTIVE HEART FAILURE: ICD-10-CM

## 2025-04-15 PROCEDURE — 3008F BODY MASS INDEX DOCD: CPT | Performed by: INTERNAL MEDICINE

## 2025-04-15 PROCEDURE — 99214 OFFICE O/P EST MOD 30 MIN: CPT | Performed by: INTERNAL MEDICINE

## 2025-04-15 ASSESSMENT — ENCOUNTER SYMPTOMS
LOSS OF SENSATION IN FEET: 0
DEPRESSION: 1
OCCASIONAL FEELINGS OF UNSTEADINESS: 0

## 2025-04-15 ASSESSMENT — PATIENT HEALTH QUESTIONNAIRE - PHQ9
10. IF YOU CHECKED OFF ANY PROBLEMS, HOW DIFFICULT HAVE THESE PROBLEMS MADE IT FOR YOU TO DO YOUR WORK, TAKE CARE OF THINGS AT HOME, OR GET ALONG WITH OTHER PEOPLE: NOT DIFFICULT AT ALL
SUM OF ALL RESPONSES TO PHQ9 QUESTIONS 1 AND 2: 1
2. FEELING DOWN, DEPRESSED OR HOPELESS: SEVERAL DAYS
1. LITTLE INTEREST OR PLEASURE IN DOING THINGS: NOT AT ALL

## 2025-04-15 ASSESSMENT — COLUMBIA-SUICIDE SEVERITY RATING SCALE - C-SSRS
6. HAVE YOU EVER DONE ANYTHING, STARTED TO DO ANYTHING, OR PREPARED TO DO ANYTHING TO END YOUR LIFE?: NO
2. HAVE YOU ACTUALLY HAD ANY THOUGHTS OF KILLING YOURSELF?: NO
1. IN THE PAST MONTH, HAVE YOU WISHED YOU WERE DEAD OR WISHED YOU COULD GO TO SLEEP AND NOT WAKE UP?: NO

## 2025-04-15 ASSESSMENT — PAIN SCALES - GENERAL: PAINLEVEL_OUTOF10: 0-NO PAIN

## 2025-04-15 NOTE — PROGRESS NOTES
Chief Complaint:   Follow up visit      History Of Present Illness:    Carol Arevalo is a 55 y.o. female presenting for routine follow up  She has a PMH significant for  systolic HF/NICM/HF with recovery (LVEF 55-60%), obesity  She underwent a repeat echocardiogram listed below , repeat LVEF reported to be 62%    Interval Hx  Doing well and has no CV complaints such as chest pain /pressure   Is not SOB at rest or on exertion   No leg edema , no orthopnea and no PND     Hospitalizations: Denies        Last Recorded Vitals:  Vitals:    04/15/25 1137   BP: 120/83   Pulse: 74   SpO2: 98%         Past Medical History:  She has a past medical history of Acute sinusitis, unspecified (11/10/2016), Acute streptococcal tonsillitis, unspecified (04/13/2017), Cardiomyopathy, unspecified (09/14/2021), Cervicalgia (05/26/2017), Encounter for gynecological examination (general) (routine) without abnormal findings (05/31/2016), Encounter for gynecological examination (general) (routine) without abnormal findings (06/10/2016), Encounter for other general counseling and advice on contraception (05/31/2016), Encounter for other screening for malignant neoplasm of breast (11/27/2017), Encounter for screening for infections with a predominantly sexual mode of transmission (05/31/2016), Encounter for screening for infections with a predominantly sexual mode of transmission (03/24/2015), Encounter for screening for infections with a predominantly sexual mode of transmission, Encounter for screening for malignant neoplasm of cervix (01/14/2022), Essential (primary) hypertension, Morbid (severe) obesity due to excess calories (Multi) (03/20/2018), Other chest pain (05/04/2017), Other conditions influencing health status, Other disorders of electrolyte and fluid balance, not elsewhere classified (10/15/2016), Other problems related to lifestyle (05/31/2016), Other problems related to lifestyle, Other pulmonary embolism without acute cor  pulmonale (12/18/2020), Personal history of other benign neoplasm (08/30/2016), Personal history of other diseases of the circulatory system, Personal history of other diseases of the circulatory system (08/27/2013), Personal history of other diseases of the digestive system (05/23/2017), Personal history of other diseases of the musculoskeletal system and connective tissue (10/13/2017), Personal history of other diseases of the respiratory system, Personal history of other diseases of the respiratory system (11/10/2016), Personal history of other specified conditions (10/15/2016), Personal history of other specified conditions (09/03/2015), Polyp of cervix uteri (02/11/2022), and Unspecified asthma, uncomplicated ([facility]) (12/22/2021).    Past Surgical History:  She has a past surgical history that includes Other surgical history (03/24/2015).      Social History:  She reports that she has never smoked. She has never used smokeless tobacco. She reports current alcohol use. She reports that she does not currently use drugs.    Family History:  No family history on file.     Allergies:  Naproxen    Outpatient Medications:  Current Outpatient Medications   Medication Instructions    acetaminophen (TYLENOL) 975 mg, oral, Every 8 hours PRN    albuterol (ProAir HFA) 90 mcg/actuation inhaler 2 puffs, inhalation, Every 4 hours PRN    albuterol 2.5 mg, nebulization, Every 6 hours PRN    carvedilol (COREG) 25 mg, oral, 2 times daily    cetirizine (ZYRTEC) 10 mg, oral, Daily PRN    ferrous sulfate 325 mg, oral, Daily with breakfast    fluticasone (Flonase) 50 mcg/actuation nasal spray 1 spray, Each Nostril, Daily, Shake gently. Before first use, prime pump. After use, clean tip and replace cap.    furosemide (Lasix) 40 mg tablet TAKE 1 TABLET TWICE DAILY AND AN ADDITIONAL TABLET AS NEEDED FOR SWELLING OR WEIGHT GAIN    hydrALAZINE (APRESOLINE) 10 mg, oral, 2 times daily    isosorbide mononitrate ER (IMDUR) 30 mg, oral,  Daily, Do not crush or chew.    losartan (COZAAR) 25 mg, oral, Daily    nebulizers misc Please dispense one nebulizer    omeprazole (PRILOSEC) 40 mg, oral, Daily before breakfast, Do not crush or chew    spironolactone (ALDACTONE) 100 mg, oral, Daily    tirzepatide (weight loss) (ZEPBOUND) 2.5 mg, subcutaneous, Every 7 days        Physical Exam:  GEN: NAD , AOX3  HEENT : JVP at the clavicle   Heart : regular rhythm , normal S1 and S2 , no murmurs   Lungs : clear , resonant , normal air entry bilaterally   Abdomen : soft , non tender   Ext: warm , well perfused , no edema   Neuro : grossly intact       Last Labs:  CBC -  Lab Results   Component Value Date    WBC 8.0 11/01/2024    HGB 11.5 (L) 11/01/2024    HCT 37.2 11/01/2024    MCV 94 11/01/2024     11/01/2024       CMP -  Lab Results   Component Value Date    CALCIUM 8.8 11/01/2024    PHOS 3.7 11/01/2024    PROT 8.6 (H) 06/24/2022    ALBUMIN 4.0 11/01/2024    AST 15 06/24/2022    ALT 8 06/24/2022    ALKPHOS 55 06/24/2022    BILITOT 0.5 06/24/2022       LIPID PANEL -   Lab Results   Component Value Date    CHOL 174 02/21/2024    TRIG 105 02/21/2024    HDL 52.2 02/21/2024    CHHDL 3.3 02/21/2024    LDLF 67 05/12/2023    VLDL 21 02/21/2024    NHDL 122 02/21/2024       RENAL FUNCTION PANEL -   Lab Results   Component Value Date    GLUCOSE 81 11/01/2024     11/01/2024    K 4.3 11/01/2024     11/01/2024    CO2 29 11/01/2024    ANIONGAP 11 11/01/2024    BUN 14 11/01/2024    CREATININE 0.93 11/01/2024    CALCIUM 8.8 11/01/2024    PHOS 3.7 11/01/2024    ALBUMIN 4.0 11/01/2024        Lab Results   Component Value Date     (H) 11/01/2024    HGBA1C 6.1 (H) 11/01/2024       Last Cardiology Tests:  ECG:  Normal sinus rhythm       Echocardiogram ( 11/2024)    1. Poorly visualized anatomical structures due to suboptimal image quality.   2. Left ventricular ejection fraction is normal, calculated by Ornelas's biplane at 62%.   3. There is normal right  ventricular global systolic function.   4. Mild aortic valve regurgitation.     Echo:( 08/2021)    1. The left ventricular systolic function is normal with a 55% estimated ejection fraction.     Stress test ( 2020)    1. No electrocardiographic evidence for ischemia at a maximal workload.   2. The adequate level of stress was achieved.      Assessment/Plan   Carol Arevalo is a 55 y.o. female presenting for routine follow up  She has a PMH significant for  systolic HF/NICM/HF with recovery (LVEF 55-60%), obesity, and MIRI, repeat echocardiogram from 11/2024 continues to show that her EF is stable at 62% .    - will consolidate her GDMT as she is on polypharmacy at various submaximal doses .   Will discontinue hydralazine and imdur . Will increase losartan to 50 mg daily and decrease spironolactone from 100 down to 50 mg daily ( unclear from notes review why she was maintained on such a high dose of spironolactone )   Will repeat a renal function panel in 7-10 days to ensure electrolytes stability   Follow up in 3 months

## 2025-04-15 NOTE — PATIENT INSTRUCTIONS
To reach Dr. Jensen's office please call [phone] ([place]). Fax [phone]. Call [phone] to schedule an appointment. You may also contact the HF RNs at [email or web address]    Thank you for coming to your appointment today. If you have any questions or need cardiac medication refills, please call the Heart Failure Office at [phone] option [phone].     STOP Hydralazine  STOP Isosorbide Mononitrate   INCREASE Losartan to 50mg daily   DECREASE Spironolactone to 50mg daily   Please have labs drawn in 7-10 labs  Follow up with Dr. Jensen in 3 months

## 2025-04-18 RX ORDER — SPIRONOLACTONE 50 MG/1
50 TABLET, FILM COATED ORAL DAILY
Qty: 90 TABLET | Refills: 3 | Status: SHIPPED | OUTPATIENT
Start: 2025-04-18 | End: 2026-04-18

## 2025-04-18 RX ORDER — FUROSEMIDE 40 MG/1
TABLET ORAL
Qty: 220 TABLET | Refills: 3 | Status: SHIPPED | OUTPATIENT
Start: 2025-04-18

## 2025-04-18 RX ORDER — CARVEDILOL 25 MG/1
25 TABLET ORAL 2 TIMES DAILY
Qty: 180 TABLET | Refills: 3 | Status: SHIPPED | OUTPATIENT
Start: 2025-04-18 | End: 2026-04-18

## 2025-04-18 RX ORDER — LOSARTAN POTASSIUM 50 MG/1
50 TABLET ORAL DAILY
Qty: 90 TABLET | Refills: 3 | Status: SHIPPED | OUTPATIENT
Start: 2025-04-18 | End: 2026-04-18

## 2025-04-22 ENCOUNTER — APPOINTMENT (OUTPATIENT)
Dept: CARDIOLOGY | Facility: HOSPITAL | Age: 55
End: 2025-04-22
Payer: COMMERCIAL

## 2025-05-13 DIAGNOSIS — R52 PAIN: ICD-10-CM

## 2025-05-14 RX ORDER — LIDOCAINE 560 MG/1
2 PATCH PERCUTANEOUS; TOPICAL; TRANSDERMAL DAILY
Qty: 60 PATCH | Refills: 2 | Status: SHIPPED | OUTPATIENT
Start: 2025-05-14 | End: 2025-06-13

## 2025-05-20 ENCOUNTER — TELEPHONE (OUTPATIENT)
Dept: CARE COORDINATION | Facility: CLINIC | Age: 55
End: 2025-05-20
Payer: COMMERCIAL

## 2025-05-20 NOTE — PROGRESS NOTES
Spoke with patient today about her blood pressure. She is taking her medications as prescribed and her readings in office have been good.     She said she does not have a home BP monitor and would like assistance with getting one. She also would like to get scheduled for her 3 month follow-up for next month. I sent a message over to the office staff informing them of this.

## 2025-06-03 ENCOUNTER — OFFICE VISIT (OUTPATIENT)
Dept: PRIMARY CARE | Facility: HOSPITAL | Age: 55
End: 2025-06-03
Payer: COMMERCIAL

## 2025-06-03 VITALS
SYSTOLIC BLOOD PRESSURE: 106 MMHG | OXYGEN SATURATION: 98 % | DIASTOLIC BLOOD PRESSURE: 76 MMHG | HEART RATE: 74 BPM | HEIGHT: 65 IN | WEIGHT: 260 LBS | BODY MASS INDEX: 43.32 KG/M2 | TEMPERATURE: 95.9 F

## 2025-06-03 DIAGNOSIS — I50.9 HEART FAILURE, UNSPECIFIED HF CHRONICITY, UNSPECIFIED HEART FAILURE TYPE: Primary | ICD-10-CM

## 2025-06-03 DIAGNOSIS — E61.1 IRON DEFICIENCY: ICD-10-CM

## 2025-06-03 DIAGNOSIS — E04.1 THYROID NODULE: ICD-10-CM

## 2025-06-03 DIAGNOSIS — R73.03 PREDIABETES: ICD-10-CM

## 2025-06-03 DIAGNOSIS — J30.2 SEASONAL ALLERGIC RHINITIS, UNSPECIFIED TRIGGER: ICD-10-CM

## 2025-06-03 PROCEDURE — 90677 PCV20 VACCINE IM: CPT | Mod: GC

## 2025-06-03 PROCEDURE — 99215 OFFICE O/P EST HI 40 MIN: CPT | Mod: GC,25

## 2025-06-03 PROCEDURE — 99215 OFFICE O/P EST HI 40 MIN: CPT

## 2025-06-03 PROCEDURE — 3008F BODY MASS INDEX DOCD: CPT

## 2025-06-03 RX ORDER — GUAIFENESIN 600 MG/1
1200 TABLET, EXTENDED RELEASE ORAL 2 TIMES DAILY PRN
Qty: 14 TABLET | Refills: 0 | Status: SHIPPED | OUTPATIENT
Start: 2025-06-03 | End: 2025-06-10

## 2025-06-03 RX ORDER — CETIRIZINE HYDROCHLORIDE 10 MG/1
10 TABLET ORAL DAILY PRN
Qty: 90 TABLET | Refills: 0 | Status: SHIPPED | OUTPATIENT
Start: 2025-06-03 | End: 2025-09-01

## 2025-06-03 ASSESSMENT — PAIN SCALES - GENERAL: PAINLEVEL_OUTOF10: 0-NO PAIN

## 2025-06-03 NOTE — PROGRESS NOTES
I reviewed the resident/fellow's documentation and discussed the patient with the resident/fellow. I agree with the resident/fellow's medical decision making as documented in the note.    Case d/w Dr. Jennings; agree with her A/P. I have personally reviewed the plan with the pt as well. Pt encouraged to follow up with the sleep clinic to get her new CPAP machine. Needs a screening mammogram and a surveillance c-scope. Would update blood work including A1C. Importance of weight loss reviewed with pt. Pt should follow up with Derm re her recent hair loss; encouraged to stop using any topicals on her scalp/hair products.     Amy Ortega MD

## 2025-06-03 NOTE — PROGRESS NOTES
Chief complaint: Routine follow-up    HPI:  Carol Arevalo is a 54 y.o. female w/ PMHx NICM/sys HFrecEF (EF 62% 11/2024), morbid obesity, remote history of PE, depressive symptoms, asthma, MIRI, and chronic body pain who presents for routine follow-up.      The patient was last seen here in March 2025.  During that visit, it was discussed how she has modified her diet with eating mostly home-cooked low-fat diet.  She had not increased the amount of activity she has done but was confident she would be walking more as the weather improved.  She has lost 6 pounds since her last visit in November 2024.  She wanted to pursue some housing options with the help of the  because she has lost her home to the city in November 2024.  Multiple referrals that had not been completed were reordered including physical therapy, sleep medicine, and screening colonoscopy.  She followed up with cardiology, and her hydralazine and Imdur were discontinued.  Her losartan was increased to 50 mg and spironolactone was decreased from 100 mg to 50 mg daily.     She states she has picked up water weight. Her legs are more swollen. Her left leg is smaller than her right leg (what she has noticed for approximately 3-5 years). Per chart review, her weights are stable. She has been taking a water pill twice a day. For the past week, she has been taking 3 water pills and states that it has been helping. Her breathing feels fine. She denies orthopnea (sleeps on 1-2 pillows so her head can be elevated). She does not prop her head up due to breathing issues. She states that she urinated 5 pounds.     She has noticed hair loss on the edges. It previously was on the sides of her scalp but now her hairline has been receding. She was previously wearing quick weaves and roberto. For the last 3 months, it is continuing to recede further back. In terms of her diet, she eats fruits/vegetables and salads everyday (with Italian seasoning). She has  pulled back from the eating a significant amount of processed foods and decreased fried foods intake. She drinks a lot of lemon/lime juice and adds a little bit of salt. Stress levels are fine now. She is taking three different types of multivitamins (for hair, skin, and nails; bone health and energy; and heart health). She has bought [employer] castor oil and mixed it with other oils/substances (tea tree oil, vitamin E) to try to grow her hair back. She denies itching in the hair.     She is still having pain in the right breast. It used to be on the side, but it has moved to the front. She can feel it when she presses on it (aggravates it). It has been going on for a while. Denies nipple discharge.     She is endorsing phlegm production and feels as though she can not cough up the last bit. Request guaifenesin.     She endorses nerve pain in the foot that has been happening for the last few months. It's on the lateral side of the right foot and feels like it is burning.     Medications:  Current Outpatient Medications   Medication Instructions    acetaminophen (TYLENOL) 975 mg, oral, Every 8 hours PRN    albuterol (ProAir HFA) 90 mcg/actuation inhaler 2 puffs, inhalation, Every 4 hours PRN    albuterol 2.5 mg, nebulization, Every 6 hours PRN    carvedilol (COREG) 25 mg, oral, 2 times daily    cetirizine (ZYRTEC) 10 mg, oral, Daily PRN    ferrous sulfate 325 mg, oral, Daily with breakfast    fluticasone (Flonase) 50 mcg/actuation nasal spray 1 spray, Each Nostril, Daily, Shake gently. Before first use, prime pump. After use, clean tip and replace cap.    furosemide (Lasix) 40 mg tablet TAKE 1 TABLET TWICE DAILY AND AN ADDITIONAL TABLET AS NEEDED FOR SWELLING OR WEIGHT GAIN    guaiFENesin (MUCINEX) 1,200 mg, oral, 2 times daily PRN, Do not crush, chew, or split.    lidocaine 4 % patch 2 patches, transdermal, Daily    losartan (COZAAR) 50 mg, oral, Daily    nebulizers misc Please dispense one nebulizer    omeprazole  (PRILOSEC) 40 mg, oral, Daily before breakfast, Do not crush or chew    spironolactone (ALDACTONE) 50 mg, oral, Daily     Allergies:  Allergies   Allergen Reactions    Naproxen Dizziness     Past medical history:  Past Medical History:   Diagnosis Date    Acute sinusitis, unspecified 11/10/2016    Subacute sinusitis, unspecified location    Acute streptococcal tonsillitis, unspecified 04/13/2017    Strep tonsillitis    Cardiomyopathy, unspecified 09/14/2021    Cardiomyopathy    Cervicalgia 05/26/2017    Spine pain, cervical    Encounter for gynecological examination (general) (routine) without abnormal findings 05/31/2016    Encounter for annual routine gynecological examination    Encounter for gynecological examination (general) (routine) without abnormal findings 06/10/2016    Well woman exam with routine gynecological exam    Encounter for other general counseling and advice on contraception 05/31/2016    Counseling for birth control, intrauterine device    Encounter for other screening for malignant neoplasm of breast 11/27/2017    Screening for breast cancer    Encounter for screening for infections with a predominantly sexual mode of transmission 05/31/2016    Routine screening for STI (sexually transmitted infection)    Encounter for screening for infections with a predominantly sexual mode of transmission 03/24/2015    Routine screening for STI (sexually transmitted infection)    Encounter for screening for infections with a predominantly sexual mode of transmission     Screening for STDs (sexually transmitted diseases)    Encounter for screening for malignant neoplasm of cervix 01/14/2022    Cervical cancer screening    Essential (primary) hypertension     Chronic hypertension    Morbid (severe) obesity due to excess calories (Multi) 03/20/2018    Obesity, morbid (more than 100 lbs over ideal weight or BMI > 40)    Other chest pain 05/04/2017    Atypical chest pain    Other conditions influencing health  status     History of vaginal delivery    Other disorders of electrolyte and fluid balance, not elsewhere classified 10/15/2016    Electrolyte and fluid disorder    Other problems related to lifestyle 05/31/2016    Other problems related to lifestyle    Other problems related to lifestyle     Other problems related to lifestyle    Other pulmonary embolism without acute cor pulmonale 12/18/2020    Pulmonary embolism    Personal history of other benign neoplasm 08/30/2016    History of uterine leiomyoma    Personal history of other diseases of the circulatory system     History of congestive heart failure    Personal history of other diseases of the circulatory system 08/27/2013    History of sinus tachycardia    Personal history of other diseases of the digestive system 05/23/2017    History of esophageal reflux    Personal history of other diseases of the musculoskeletal system and connective tissue 10/13/2017    History of muscle pain    Personal history of other diseases of the respiratory system     History of sore throat    Personal history of other diseases of the respiratory system 11/10/2016    History of sinusitis    Personal history of other specified conditions 10/15/2016    History of dizziness    Personal history of other specified conditions 09/03/2015    History of bradycardia    Polyp of cervix uteri 02/11/2022    Cervical polyp    Unspecified asthma, uncomplicated ([facility]) 12/22/2021    Asthma     Surgical history:  Past Surgical History:   Procedure Laterality Date    OTHER SURGICAL HISTORY  03/24/2015    Biopsy Vulvar     Family history:  No family history on file.    Social history:   reports that she has never smoked. She has never used smokeless tobacco. She reports current alcohol use. She reports that she does not currently use drugs.    Works for the [employer].     Review of systems:  As per HPI    Vitals:  Vitals:    06/03/25 1315   BP: 106/76   Pulse: 74   Temp: 35.5 °C (95.9 °F)  "  SpO2: 98%     Physical exam:  GEN: Awake, alert, no acute distress  HEAD: Normocephalic, atraumatic  EYES: Pupils are equal, round, and reactive to light  CV: Normal S1 and S2, no murmurs, rubs, or gallops  CHEST: Tenderness to palpation on the anterior right chest lateral to the midline  PULM: CTA bilaterally with no increased work of breathing  ABD: Soft, nondistended, nontender to palpation  EXTREM: No peripheral edema appreciated on exam. Left lower extremity smaller than her right  HAIR: Receding hair on the temple regions and the frontal hair line  SKIN: Macules noted on her arms, nevi in the maxilla region of the face    Labs:  Lab Results   Component Value Date    WBC 8.0 11/01/2024    HGB 11.5 (L) 11/01/2024    HCT 37.2 11/01/2024    MCV 94 11/01/2024     11/01/2024     Lab Results   Component Value Date    GLUCOSE 81 11/01/2024    CALCIUM 8.8 11/01/2024     11/01/2024    K 4.3 11/01/2024    CO2 29 11/01/2024     11/01/2024    BUN 14 11/01/2024    CREATININE 0.93 11/01/2024     Lab Results   Component Value Date    HGBA1C 6.1 (H) 11/01/2024      Lab Results   Component Value Date    CHOL 174 02/21/2024    CHOL 137 05/12/2023     Lab Results   Component Value Date    HDL 52.2 02/21/2024    HDL 48.5 05/12/2023     Lab Results   Component Value Date    LDLCALC 101 (H) 02/21/2024     Lab Results   Component Value Date    TRIG 105 02/21/2024    TRIG 107 05/12/2023     No components found for: \"CHOLHDL\"    Imaging:  No results found.    Assessment and plan:  Carol Arevalo is a 54 y.o. female w/ PMHx NICM/sys HFrecEF (EF 62% 11/2024), morbid obesity, remote history of PE, depressive symptoms, asthma, MIRI, and chronic body pain who presents for routine follow-up.     #Moderate MIRI  #Obesity  - Sleep study 6/2024: moderate obstructive sleep apnea. If PAP therapy will be pursued, consider empiric initiation of auto-adjusting CPAP with settings of 5-15 cm H2O   - Educated pt that this untreated " will make it harder to lose weight  Plan:  - Replaced sleep medicine referral at the last visit  - Patient needs her CPAP machine  - Continue with lifestyle modifications   - PT referral sent for chronic back pain, deconditioning, and LE lymphedema (for stocking fitting).     #HFrecEF (62%) 2/2 NICM  #HTN  #Orthostatic Hypotension (resolved)  - TTE 11/2024: EF 62%, mild AR, paradoxical septal wall motion  - Lipid panel 2/2024: Chol 174, HDL-c 52.2, LDL-c: 101, TRIG 105.   Plan:  - GDMT: c/w carvedilol 25 mg bid, spironolactone 50 mg daily, c/w Losartan 25 mg daily   - c/w furosemide 40mg BID + 40 mg PRN (take if gaining more than 2 pounds in 2 days)  - Follows w/ Dr Sainz for Cardiology - is to return to clinic in 3 months  - RFP, Mag  - Repeat lipid panel    #Prediabetes  - Last hemoglobin A1c of 6.1 (11/2024)  Plan:  - Repeat hemoglobin A1c    #GERD  #Atypical Chest pain (improved)  #Trouble swallowing (improved)  - Cluster of symptoms most concerning for possible GERD. -- Resolved w/ omeprazole use  - Low concern for cardiac chest pain given lack of typical symptoms and normal stress test in 2020  - Low c/f thyroid issue given normal TSH w/ reflex and normal thyroid US  Plan:   - Continue with omeprazole    #Asthma?  #Allergic rhinitis  - No PFTs on file  - Rarely experiences symptoms  - Endorsing phlegm but is able to get it out  Plan:  - PFTs not yet done  - Continue with Flonase 1 spray each nostril daily. (Uses PRN)  - C/w cetirizine daily as needed.  - C/w albuterol nebs/rescue inhaler as needed  - Guaifenesin 7 days course    #Asymptomatic Leiomyomas  #Pelvic pressure (improved)  - Pelvic/transvaginal U/S ordered by OB/GYN: numerous leiomyomas  - Denies significant symptoms or AUB    #Musculoskeletal back pain  #Aches and Pains  - Right latissimus dorsi pain noted during last visit  - Patient has job which requires lifting a lot of objects off ground  Plan:   - Continue tylenol prn   - PT order placed  during the last visit     #Multiple BL facial nevi  - Not currently concerning  Plan:  - Dermatology referral placed during the last visit    #Hair loss  - Could be related to previous hair treatments  Plan:  - Recommended decreasing multivitamins to one multivitamin  - Recommended stopping the oils until seen by Dermatology referral  - Dermatology referral placed at last visit  - Check TSH    #Iron deficiency  - Has a chart diagnosis of iron deficiency  Plan:  - Repeat iron, TIBC, ferritin  - CBC and autodifferential    #Health Maintenance  Infectious Disease:   - HIV, Hep C, syphilis negative in 2024  Immunizations:   - Influenza: next season   - Tdap: 2024, due 2034   - Shingles: due   - COVID-19: due   - Pneumonia: PPSV 2015, due for PCV20 (ordered and obtained in clinic today  Cancer:   - Breast: Mammogram (due)   - Cervical cancer: Pap smear due in 2027  - Colorectal: C-scope was reordered at the last visit. I spoke to the patient regarding doing a Cologuard instead. She states that she prefers a colonoscopy, and she wants to do it over the summer   - Lung: Not indicated  - DEXA scanning: not yet indicated    Return to clinic in 3 months.     Patient and plan discussed with attending physician, Dr. Jordan Jennings MD  Internal Medicine, PGY-2

## 2025-06-03 NOTE — PATIENT INSTRUCTIONS
Dear Ms. Arevalo,     As discussed today, our plan is:     Labs - we ordered labs today and will call you with any abnormal results. If you have any questions or concerns prior to us calling feel free to call our office to have your questions addressed.   Medication changes: We did not make any changes to your medications. You were prescribed a short course of guaifenesin to help with congestion. You can take an extra furosemide pill to help you feel better if you have a lot swelling (e.g. if you take more than 2 pounds in 2 days). On exam, you do not have a lot of swelling and your lungs are clear. Continue to take your medications as prescribed.   We recommend that you cut back on all the multivitamins. You can take a single complete multivitamin every day. You should hold off on putting the oils in your hair until you see the dermatologist.   Consultations - you were referred to see the following specialists: dermatology (for your skin and hair), sleep medicine. You should receive a call from central scheduling in the next few days if you do not receive a call within 3-5 business days please call [phone] to schedule your appointment.   4.   If you smoke or use other tobacco products, take steps to quit. Call [phone] for more information or to set up an appointment with  [facility]. The [place] [facility] Quit Line is a free resource for people who don’t have insurance, receive Medicaid, pregnant women, or members of the [place] [facility]. Call 3-782-QUIT-NOW or 1-111.153.2641.  5.    You got the flu shot today  6.    Please get your mammogram and colonoscopy scheduled.     Please come back to see us in: 3 months  ------  If you have any problems or questions, please contact the clinic at [phone] to leave a message. Our fax number is [phone]. If your issue cannot wait until the next business day, please go to urgent care or the emergency department.     I  also strongly urge all of my patients to register for [employer] by going to: [email or web address]  (The  staff can also send you a text/email link to register when you check out).    No shows: It is understandable if you are unable to make it to a visit, but please cancel your appointment instead of not showing up. This helps to give other patients access to primary care and keeps wait times low.      [facility]   [phone]

## 2025-06-04 LAB
ALBUMIN SERPL-MCNC: 4 G/DL (ref 3.6–5.1)
BASOPHILS # BLD AUTO: 59 CELLS/UL (ref 0–200)
BASOPHILS NFR BLD AUTO: 1 %
BNP SERPL-MCNC: 24 PG/ML
BUN SERPL-MCNC: 13 MG/DL (ref 7–25)
BUN/CREAT SERPL: NORMAL (CALC) (ref 6–22)
CALCIUM SERPL-MCNC: 9 MG/DL (ref 8.6–10.4)
CHLORIDE SERPL-SCNC: 100 MMOL/L (ref 98–110)
CHOLEST SERPL-MCNC: 145 MG/DL
CHOLEST/HDLC SERPL: 3.5 (CALC)
CO2 SERPL-SCNC: 27 MMOL/L (ref 20–32)
CREAT SERPL-MCNC: 0.9 MG/DL (ref 0.5–1.03)
EGFRCR SERPLBLD CKD-EPI 2021: 75 ML/MIN/1.73M2
EOSINOPHIL # BLD AUTO: 260 CELLS/UL (ref 15–500)
EOSINOPHIL NFR BLD AUTO: 4.4 %
ERYTHROCYTE [DISTWIDTH] IN BLOOD BY AUTOMATED COUNT: 12.5 % (ref 11–15)
EST. AVERAGE GLUCOSE BLD GHB EST-MCNC: 128 MG/DL
EST. AVERAGE GLUCOSE BLD GHB EST-SCNC: 7.1 MMOL/L
FERRITIN SERPL-MCNC: 55 NG/ML (ref 16–232)
GLUCOSE SERPL-MCNC: 84 MG/DL (ref 65–99)
HBA1C MFR BLD: 6.1 %
HCT VFR BLD AUTO: 37.4 % (ref 35–45)
HDLC SERPL-MCNC: 42 MG/DL
HGB BLD-MCNC: 11.5 G/DL (ref 11.7–15.5)
IRON SATN MFR SERPL: 26 % (CALC) (ref 16–45)
IRON SERPL-MCNC: 88 MCG/DL (ref 45–160)
LDLC SERPL CALC-MCNC: 80 MG/DL (CALC)
LYMPHOCYTES # BLD AUTO: 2620 CELLS/UL (ref 850–3900)
LYMPHOCYTES NFR BLD AUTO: 44.4 %
MAGNESIUM SERPL-MCNC: 2 MG/DL (ref 1.5–2.5)
MCH RBC QN AUTO: 28.5 PG (ref 27–33)
MCHC RBC AUTO-ENTMCNC: 30.7 G/DL (ref 32–36)
MCV RBC AUTO: 92.8 FL (ref 80–100)
MONOCYTES # BLD AUTO: 478 CELLS/UL (ref 200–950)
MONOCYTES NFR BLD AUTO: 8.1 %
NEUTROPHILS # BLD AUTO: 2484 CELLS/UL (ref 1500–7800)
NEUTROPHILS NFR BLD AUTO: 42.1 %
NONHDLC SERPL-MCNC: 103 MG/DL (CALC)
PHOSPHATE SERPL-MCNC: 4.2 MG/DL (ref 2.5–4.5)
PLATELET # BLD AUTO: 244 THOUSAND/UL (ref 140–400)
PMV BLD REES-ECKER: 11.1 FL (ref 7.5–12.5)
POTASSIUM SERPL-SCNC: 4.2 MMOL/L (ref 3.5–5.3)
RBC # BLD AUTO: 4.03 MILLION/UL (ref 3.8–5.1)
SODIUM SERPL-SCNC: 137 MMOL/L (ref 135–146)
TIBC SERPL-MCNC: 340 MCG/DL (CALC) (ref 250–450)
TRIGL SERPL-MCNC: 133 MG/DL
TSH SERPL-ACNC: 1.16 MIU/L
WBC # BLD AUTO: 5.9 THOUSAND/UL (ref 3.8–10.8)

## 2025-06-13 ENCOUNTER — TELEPHONE (OUTPATIENT)
Dept: PRIMARY CARE | Facility: HOSPITAL | Age: 55
End: 2025-06-13
Payer: COMMERCIAL

## 2025-06-13 NOTE — TELEPHONE ENCOUNTER
Patient called with complains of ear pain due to metal slamming. Would like advice on what to do about pains.

## 2025-06-13 NOTE — TELEPHONE ENCOUNTER
Called and talked to pt regarding her ear concerns. Stated that she had a workplace incident involving loud noise from metal slamming, which occurred 2 days ago. Pt has mild ear soreness/discomfort with no hearing loss. States that hearing and pain are improving from prior. Instructed pt to schedule an appointment with primary care clinic ASAP, ideally within the next week, and provided instructions regarding going to ER or urgent care should symptoms worsen or if she develops hearing loss.

## 2025-07-29 ENCOUNTER — APPOINTMENT (OUTPATIENT)
Dept: CARDIOLOGY | Facility: HOSPITAL | Age: 55
End: 2025-07-29
Payer: COMMERCIAL

## 2025-08-11 DIAGNOSIS — I50.32 CHRONIC DIASTOLIC CONGESTIVE HEART FAILURE: ICD-10-CM

## 2025-08-12 RX ORDER — LOSARTAN POTASSIUM 25 MG/1
25 TABLET ORAL DAILY
Qty: 90 TABLET | Refills: 1 | Status: SHIPPED | OUTPATIENT
Start: 2025-08-12

## 2025-08-12 RX ORDER — SPIRONOLACTONE 100 MG/1
100 TABLET, FILM COATED ORAL DAILY
Qty: 90 TABLET | Refills: 1 | Status: SHIPPED | OUTPATIENT
Start: 2025-08-12

## 2025-08-26 ENCOUNTER — TELEMEDICINE (OUTPATIENT)
Dept: SLEEP MEDICINE | Facility: HOSPITAL | Age: 55
End: 2025-08-26
Payer: COMMERCIAL

## 2025-08-26 DIAGNOSIS — G47.33 OSA (OBSTRUCTIVE SLEEP APNEA): Primary | ICD-10-CM

## 2025-08-26 DIAGNOSIS — E66.813 OBESITY, CLASS III, BMI 40-49.9 (MORBID OBESITY): ICD-10-CM

## 2025-08-26 PROCEDURE — 99244 OFF/OP CNSLTJ NEW/EST MOD 40: CPT | Performed by: GENERAL PRACTICE

## 2025-08-26 PROCEDURE — 1036F TOBACCO NON-USER: CPT | Performed by: GENERAL PRACTICE

## 2025-09-02 ENCOUNTER — APPOINTMENT (OUTPATIENT)
Dept: TRANSPLANT | Facility: HOSPITAL | Age: 55
End: 2025-09-02
Payer: COMMERCIAL

## 2025-09-03 ENCOUNTER — OFFICE VISIT (OUTPATIENT)
Dept: PRIMARY CARE | Facility: HOSPITAL | Age: 55
End: 2025-09-03
Payer: COMMERCIAL

## 2025-09-03 VITALS
HEART RATE: 71 BPM | DIASTOLIC BLOOD PRESSURE: 64 MMHG | BODY MASS INDEX: 42.32 KG/M2 | WEIGHT: 254 LBS | SYSTOLIC BLOOD PRESSURE: 98 MMHG | TEMPERATURE: 96.3 F | HEIGHT: 65 IN | OXYGEN SATURATION: 98 %

## 2025-09-03 DIAGNOSIS — J30.2 SEASONAL ALLERGIC RHINITIS, UNSPECIFIED TRIGGER: ICD-10-CM

## 2025-09-03 DIAGNOSIS — Z01.419 WELL WOMAN EXAM: Primary | ICD-10-CM

## 2025-09-03 DIAGNOSIS — I50.32 CHRONIC DIASTOLIC CONGESTIVE HEART FAILURE: ICD-10-CM

## 2025-09-03 DIAGNOSIS — J45.909 ASTHMA, UNSPECIFIED ASTHMA SEVERITY, UNSPECIFIED WHETHER COMPLICATED, UNSPECIFIED WHETHER PERSISTENT (HHS-HCC): ICD-10-CM

## 2025-09-03 DIAGNOSIS — R52 PAIN: ICD-10-CM

## 2025-09-03 DIAGNOSIS — J45.20 MILD INTERMITTENT ASTHMA, UNSPECIFIED WHETHER COMPLICATED (HHS-HCC): ICD-10-CM

## 2025-09-03 DIAGNOSIS — Z23 NEEDS FLU SHOT: ICD-10-CM

## 2025-09-03 PROCEDURE — 99212 OFFICE O/P EST SF 10 MIN: CPT

## 2025-09-03 RX ORDER — ACETAMINOPHEN 325 MG/1
1000 TABLET ORAL EVERY 8 HOURS PRN
Qty: 30 TABLET | Refills: 3 | Status: SHIPPED | OUTPATIENT
Start: 2025-09-03

## 2025-09-03 RX ORDER — LOSARTAN POTASSIUM 50 MG/1
50 TABLET ORAL DAILY
Qty: 90 TABLET | Refills: 3 | Status: SHIPPED | OUTPATIENT
Start: 2025-09-03 | End: 2026-09-03

## 2025-09-03 RX ORDER — CETIRIZINE HYDROCHLORIDE 10 MG/1
10 TABLET ORAL DAILY PRN
Qty: 90 TABLET | Refills: 0 | Status: SHIPPED | OUTPATIENT
Start: 2025-09-03 | End: 2025-12-02

## 2025-09-03 RX ORDER — ALBUTEROL SULFATE 90 UG/1
2 INHALANT RESPIRATORY (INHALATION) EVERY 4 HOURS PRN
Qty: 16 G | Refills: 11 | Status: SHIPPED | OUTPATIENT
Start: 2025-09-03 | End: 2026-09-03

## 2025-09-03 ASSESSMENT — ENCOUNTER SYMPTOMS
OCCASIONAL FEELINGS OF UNSTEADINESS: 0
DEPRESSION: 0
LOSS OF SENSATION IN FEET: 0

## 2025-09-03 ASSESSMENT — PAIN SCALES - GENERAL: PAINLEVEL_OUTOF10: 0-NO PAIN
